# Patient Record
(demographics unavailable — no encounter records)

---

## 2024-10-08 NOTE — BEGINNING OF VISIT
[0] : 2) Feeling down, depressed, or hopeless: Not at all (0) [PHQ-2 Negative] : PHQ-2 Negative [PHQ-9 Negative] : PHQ-9 Negative [Pain Scale: ___] : On a scale of 1-10, today the patient's pain is a(n) [unfilled]. [Never] : Never [Date Discussed (MM/DD/YY): ___] : Discussed: [unfilled] [Reviewed, no changes] : Reviewed, no changes [[id number]] : 0

## 2024-10-08 NOTE — BEGINNING OF VISIT
[0] : 2) Feeling down, depressed, or hopeless: Not at all (0) [PHQ-2 Negative] : PHQ-2 Negative [PHQ-9 Negative] : PHQ-9 Negative [Pain Scale: ___] : On a scale of 1-10, today the patient's pain is a(n) [unfilled]. [Never] : Never [Date Discussed (MM/DD/YY): ___] : Discussed: [unfilled] [Reviewed, no changes] : Reviewed, no changes [[id number]] : 0

## 2024-10-08 NOTE — BEGINNING OF VISIT
[0] : 2) Feeling down, depressed, or hopeless: Not at all (0) [PHQ-2 Negative] : PHQ-2 Negative [PHQ-9 Negative] : PHQ-9 Negative [Pain Scale: ___] : On a scale of 1-10, today the patient's pain is a(n) [unfilled]. [Never] : Never [Date Discussed (MM/DD/YY): ___] : Discussed: [unfilled] [Reviewed, no changes] : Reviewed, no changes [[id number]] : 0

## 2024-10-08 NOTE — REVIEW OF SYSTEMS
[Loss of Hearing] : loss of hearing [Lower Ext Edema] : lower extremity edema [Joint Pain] : joint pain [Negative] : Allergic/Immunologic

## 2024-10-10 NOTE — HISTORY OF PRESENT ILLNESS
[de-identified] : f/u of right hip had MRI showed partial thickness abductor tear has ttp over the GT at this point her main complaint is the abductor lurch while walking discussed trx options including PT vs surgery at this time will try PT would like to avoid surgery script given for PT f/u after PT

## 2024-10-11 NOTE — PHYSICAL EXAM
[Ambulatory and capable of all self care but unable to carry out any work activities] : Status 2- Ambulatory and capable of all self care but unable to carry out any work activities. Up and about more than 50% of waking hours [Normal] : affect appropriate [de-identified] : Uses cane for ambulation. [de-identified] : Old and well healed right breast mastectomy scar. [de-identified] : Decreased range of motion in her right shoulder status post right-sided mastectomy.

## 2024-10-11 NOTE — PHYSICAL EXAM
[Ambulatory and capable of all self care but unable to carry out any work activities] : Status 2- Ambulatory and capable of all self care but unable to carry out any work activities. Up and about more than 50% of waking hours [Normal] : affect appropriate [de-identified] : Uses cane for ambulation. [de-identified] : Old and well healed right breast mastectomy scar. [de-identified] : Decreased range of motion in her right shoulder status post right-sided mastectomy.

## 2024-10-11 NOTE — HISTORY OF PRESENT ILLNESS
[Disease: _____________________] : Disease: [unfilled] [AJCC Stage: ____] : AJCC Stage: [unfilled] [de-identified] : CC: Here to continue her treatment for right-sided breast cancer.  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  She was treated as below.  Please note records were in [place] and I did my best with my nurse practitioner to interpret the results but they are aware that English translation as needed.  07/2023 She relocated from [place] to continue her treatment here in the [facility].  The following is a summary of past treatments that she did in [place], [place] and [place]: 2013 she noticed hardness in her right breast, but biopsy revealed only fatty tissue and her mammogram was negative. 10/10/2014 Initially diagnosed with right breast carcinoma stage IIIb ([id number]) with right supraclavicular lymph nodes involvement.  Biopsy revealed:  11/23/2014-04/25/2015 completed 6 courses of pertuzumab, trastuzumab and docetaxel. 04/25/2025-11/10/2015 completed 6 cycles of Herceptin IV. 04/2015 started tamoxifen 1 tab daily.  05/20/2015 underwent right mastectomy with lymph nodes excision. 08/26//2015 radiation treatment to right breast. 11/16/2016 PET - progression in right supraclavicular lymph node. 01/20/2017 PET - progression in right supraclavicular lymph node increased in size. 01/20/2017 completed a course of tamoxifen. 01/2017 noted with progression in right subclavicular lymph node. Her treatment was changed to trastuzumab and anastrozole and she got it from 09/20/2017 to 08/20/2017.  which later was changed to exemestane, and she continued on it till 2019 when she was noted with progression to lymph nodes.  Again, she had biopsy, radiation and changed to a different aromatase inhibitor.  08/14/2020 again progression in the lymph nodes of her right neck.  09/03/2020 lymph node biopsy revealed: poorly differentiated carcinoma. 09/2020 started on trastuzumab and exemestane. 05/2021 she had radiation treatment to right supraclavicular area. 06/2022 and 12/2022 PET revealed positive response to the treatment and decrease FGD uptake in lymph nodes on both sides of her neck, right supraclavicular and mediastinal.  and started Kadcyla 3.6 mg/kg.  She received 5 treatments and had PET scan which showed response to the treatment.  She had 2 additional treatments after that and tolerated it well. Her last treatment was on 06/08/2023 and then postponed due to thrombocytopenia. [de-identified] : ER+Her2+ HI -0 [de-identified] : 09/01/2023 accompanied by her son; Reports feeling well, no changes in chronic conditions or new complaints since the last visit.  09/06/2023 accompanied by her son; Reports feeling well, but noticed with increasing bruising.  9/27/23 She is here for follow up. She is due for cycle 3 in [facility] but cycle 7 total of Kadcyla. PLT today are 64,000 She has some bruising but no major bleeding.  10/18/2023 accompanied by son; Reports feeling well, but with difficulties to control her BP as she is watching news; no other changes in chronic conditions or new complaints since the last visit.  11/08/2023 accompanied by her daughter-in-law; Reports feeling well, but has discomfort at right neck area "where were previous lymph nodes before the last progression".  11/29/2023 accompanied by her son; Reports feeling well, no changes in chronic conditions or new complaints since the last visit. She had PET/CT on 11/22/23 [facility]. CBC today with stable mild thrombocytopenia. Due for cycle 6 of Kadcyla today.  12/20/2023 accompanied by her son; Reports feeling well, no changes in chronic conditions or new complaints since the last visit.  01/10/2024 accompanied by her son; Reports feeling well now, but had RUQ discomfort for 1 week post last treatment - all resolved now.  01/31/2024 accompanied by her daughter-in-law; Reports feeling well, but with RUQ discomfort for a few days after the last treatment - self-resolved.  2/21/24 She is here for follow-up. She is due for cycle 10 of Kadcyla CBC is stable with . She had a PET.CT on 2/20/24: IMPRESSION: 1. Since November 21, 2023, no new site of pathologic FDG uptake to suggest biologic tumor activity.  4/3/2024 She is here for follow up. She is due for cycle 12 of Kadcyla today CBC is with PLT 74. She feels well. had normal dexa.  04/24/2024 accompanied by her son; Reports feeling well at the baseline of her health status, no changes in chronic conditions or new complaints since the last visit.  6/5/24 She is here for follow up. She has been having some RUQ pain. LFTs stable give or take maybe Gallstones. CBC today stable with PLT 89; No other issues.  06/26/2024 accompanied by her son; Reports feeling well at the baseline of her health status, no changes in chronic conditions or new complaints since the last visit. Recent echo with LVEF 66%.  07/17/2024 accompanied by her daughter-in-law; Reports feeling well at the baseline of her health status, no changes in chronic conditions or new complaints since the last visit. She has whitecoat syndrome and her blood pressure goes up.  08/07/2024 Reports feeling well at the baseline of her health status, no changes in chronic conditions or new complaints since the last visit.  08/28/2024 Reports feeling well at the baseline of her health status, no changes in chronic conditions or new complaints since the last visit. She did not start Promacta yet, but has it ready at home as we decided to see what her PLT level will be today.  09/18/2024 She is here for F/U and treatment. We tried to switch Nplate to Promacta, but she developed significant generalized pruritus, and we stopped it. She took Promacta for a few days only. She feels well now, and pruritus resolved.  10/08/2024 Reports feeling well at the baseline of her health status, no changes in chronic conditions or new complaints since the last visit.

## 2024-10-11 NOTE — ASSESSMENT
[Palliative] : Goals of care discussed with patient: Palliative [Patient/Caregiver not ready to engage] : Patient/Caregiver not ready to engage [FreeTextEntry1] : # Malignant neoplasm of right breast initially staged IIIB ([id number]) diagnosed in 2014. - Briefly she has been treated with Taxotere, Pertuzumab and Herceptin, Herceptin maintenance, trastuzumab, exemestane, Talbert, Anastrozole, multiple radiation to the supraclavicular and chest wall as well, and now on TDM 1 with demonstrated response unfortunately course has been complicated by thrombocytopenia. She was previously on full dose 3.6 mg /kg but this has been on hold due to thrombocytopenia. She got total of 4 cycles prior transferring her care to us. - Ideally translation of records into English will be helpful. - 08/11/2023 restarted TDM-1 with Ado-trastuzumab emtansine (Kadcyla) at a lower dose level 3 mg/kg given the thrombocytopenia (in past) is on his platelet count is greater than 75,000 - as Cycle 5. - 09/27/2023 Ado-trastuzumab emtansine (Kadcyla) dose decrease to 2.4 mg/kg due to thrombocytopenia. - 11/08/2023 the patient reports right supraclavicular discomfort (similar as to when she progressed the last time). - 11/22/2023 PET - No definite sites of pathologic FDG uptake. - 02/20/2024 PET/CT - [facility]. - 02/2024 ECHO EF 67%. - 06/20/2024 ECHO LV Ejection Fraction EF of 66 %. - 09/12/2024 ECHO LV Ejection Fraction EF of 66 %. - 09/23/2024 CT C/A/P - Since  July 11, 2024. No current CT evidence of active intrathoracic disease. Post therapeutic changes from right mastectomy, involving right upper lung field. No evidence of metastatic disease within the abdomen and pelvis.  # Moderate thrombocytopenia from treatment. - had dose reductions. - 12/20/2023 started NPlate 3 mg/kg with every treatment Q3W if PLT<50-60K. - 08/2024 INS denied continuation of Nplate and therefore we decided to switch to Promacta 25 mg PO QD. - 09/18/2024 unable to tolerate Promacta due to significant pruritus and therefore d/c and reinstitute Nplate at 1 mcg/kg.  # Mild LFTs likely form Kadcyla less likely from statin - stable - will monitor. - she has known gallstones as well with colic will see what CT shows.  10/07/2024 CT and Labs reviewed and results discussed with the patient - remains clinically stable to continue current management. All questions were answered to satisfaction.  PLAN: - continue Ado-trastuzumab emtansine (Kadcyla) 2.4 mg/kg Q21D - C21 GIVE TODAY. - continue Nplate at 1 mcg/kg Q3W - GIVE TODAY. - repeat ECHOcardio if symptomatic or every 3-4 months - 12/2024. - repeat CT C/A/P for monitoring Q3M - 01/2025. - Labs today: CBC CMP Ca15-3 RTC in 3 weeks with CBC CMP Ca15-3 and same day treatment. [AdvancecareDate] : 12/20/2023

## 2024-10-11 NOTE — HISTORY OF PRESENT ILLNESS
[Disease: _____________________] : Disease: [unfilled] [AJCC Stage: ____] : AJCC Stage: [unfilled] [de-identified] : CC: Here to continue her treatment for right-sided breast cancer.  This is a very pleasant 80-year-old female she has a history of recurrent ER positive, [place] negative, HER2 positive breast cancer.  She was treated as below.  Please note records were in [place] and I did my best with my nurse practitioner to interpret the results but they are aware that English translation as needed.  07/2023 She relocated from [place] to continue her treatment here in the [facility].  The following is a summary of past treatments that she did in [place], [place] and [place]: 2013 she noticed hardness in her right breast, but biopsy revealed only fatty tissue and her mammogram was negative. 10/10/2014 Initially diagnosed with right breast carcinoma stage IIIb ([id number]) with right supraclavicular lymph nodes involvement.  Biopsy revealed:  11/23/2014-04/25/2015 completed 6 courses of pertuzumab, trastuzumab and docetaxel. 04/25/2025-11/10/2015 completed 6 cycles of Herceptin IV. 04/2015 started tamoxifen 1 tab daily.  05/20/2015 underwent right mastectomy with lymph nodes excision. 08/26//2015 radiation treatment to right breast. 11/16/2016 PET - progression in right supraclavicular lymph node. 01/20/2017 PET - progression in right supraclavicular lymph node increased in size. 01/20/2017 completed a course of tamoxifen. 01/2017 noted with progression in right subclavicular lymph node. Her treatment was changed to trastuzumab and anastrozole and she got it from 09/20/2017 to 08/20/2017.  which later was changed to exemestane, and she continued on it till 2019 when she was noted with progression to lymph nodes.  Again, she had biopsy, radiation and changed to a different aromatase inhibitor.  08/14/2020 again progression in the lymph nodes of her right neck.  09/03/2020 lymph node biopsy revealed: poorly differentiated carcinoma. 09/2020 started on trastuzumab and exemestane. 05/2021 she had radiation treatment to right supraclavicular area. 06/2022 and 12/2022 PET revealed positive response to the treatment and decrease FGD uptake in lymph nodes on both sides of her neck, right supraclavicular and mediastinal.  and started Kadcyla 3.6 mg/kg.  She received 5 treatments and had PET scan which showed response to the treatment.  She had 2 additional treatments after that and tolerated it well. Her last treatment was on 06/08/2023 and then postponed due to thrombocytopenia. [de-identified] : ER+Her2+ OR -0 [de-identified] : 09/01/2023 accompanied by her son; Reports feeling well, no changes in chronic conditions or new complaints since the last visit.  09/06/2023 accompanied by her son; Reports feeling well, but noticed with increasing bruising.  9/27/23 She is here for follow up. She is due for cycle 3 in [facility] but cycle 7 total of Kadcyla. PLT today are 64,000 She has some bruising but no major bleeding.  10/18/2023 accompanied by son; Reports feeling well, but with difficulties to control her BP as she is watching news; no other changes in chronic conditions or new complaints since the last visit.  11/08/2023 accompanied by her daughter-in-law; Reports feeling well, but has discomfort at right neck area "where were previous lymph nodes before the last progression".  11/29/2023 accompanied by her son; Reports feeling well, no changes in chronic conditions or new complaints since the last visit. She had PET/CT on 11/22/23 [facility]. CBC today with stable mild thrombocytopenia. Due for cycle 6 of Kadcyla today.  12/20/2023 accompanied by her son; Reports feeling well, no changes in chronic conditions or new complaints since the last visit.  01/10/2024 accompanied by her son; Reports feeling well now, but had RUQ discomfort for 1 week post last treatment - all resolved now.  01/31/2024 accompanied by her daughter-in-law; Reports feeling well, but with RUQ discomfort for a few days after the last treatment - self-resolved.  2/21/24 She is here for follow-up. She is due for cycle 10 of Kadcyla CBC is stable with . She had a PET.CT on 2/20/24: IMPRESSION: 1. Since November 21, 2023, no new site of pathologic FDG uptake to suggest biologic tumor activity.  4/3/2024 She is here for follow up. She is due for cycle 12 of Kadcyla today CBC is with PLT 74. She feels well. had normal dexa.  04/24/2024 accompanied by her son; Reports feeling well at the baseline of her health status, no changes in chronic conditions or new complaints since the last visit.  6/5/24 She is here for follow up. She has been having some RUQ pain. LFTs stable give or take maybe Gallstones. CBC today stable with PLT 89; No other issues.  06/26/2024 accompanied by her son; Reports feeling well at the baseline of her health status, no changes in chronic conditions or new complaints since the last visit. Recent echo with LVEF 66%.  07/17/2024 accompanied by her daughter-in-law; Reports feeling well at the baseline of her health status, no changes in chronic conditions or new complaints since the last visit. She has whitecoat syndrome and her blood pressure goes up.  08/07/2024 Reports feeling well at the baseline of her health status, no changes in chronic conditions or new complaints since the last visit.  08/28/2024 Reports feeling well at the baseline of her health status, no changes in chronic conditions or new complaints since the last visit. She did not start Promacta yet, but has it ready at home as we decided to see what her PLT level will be today.  09/18/2024 She is here for F/U and treatment. We tried to switch Nplate to Promacta, but she developed significant generalized pruritus, and we stopped it. She took Promacta for a few days only. She feels well now, and pruritus resolved.  10/08/2024 Reports feeling well at the baseline of her health status, no changes in chronic conditions or new complaints since the last visit.

## 2024-10-11 NOTE — ASSESSMENT
[Palliative] : Goals of care discussed with patient: Palliative [Patient/Caregiver not ready to engage] : Patient/Caregiver not ready to engage [FreeTextEntry1] : # Malignant neoplasm of right breast initially staged IIIB ([id number]) diagnosed in 2014. - Briefly she has been treated with Taxotere, Pertuzumab and Herceptin, Herceptin maintenance, trastuzumab, exemestane, Talbert, Anastrozole, multiple radiation to the supraclavicular and chest wall as well, and now on TDM 1 with demonstrated response unfortunately course has been complicated by thrombocytopenia. She was previously on full dose 3.6 mg /kg but this has been on hold due to thrombocytopenia. She got total of 4 cycles prior transferring her care to us. - Ideally translation of records into English will be helpful. - 08/11/2023 restarted TDM-1 with Ado-trastuzumab emtansine (Kadcyla) at a lower dose level 3 mg/kg given the thrombocytopenia (in past) is on his platelet count is greater than 75,000 - as Cycle 5. - 09/27/2023 Ado-trastuzumab emtansine (Kadcyla) dose decrease to 2.4 mg/kg due to thrombocytopenia. - 11/08/2023 the patient reports right supraclavicular discomfort (similar as to when she progressed the last time). - 11/22/2023 PET - No definite sites of pathologic FDG uptake. - 02/20/2024 PET/CT - [facility]. - 02/2024 ECHO EF 67%. - 06/20/2024 ECHO LV Ejection Fraction EF of 66 %. - 09/12/2024 ECHO LV Ejection Fraction EF of 66 %. - 09/23/2024 CT C/A/P - Since  July 11, 2024. No current CT evidence of active intrathoracic disease. Post therapeutic changes from right mastectomy, involving right upper lung field. No evidence of metastatic disease within the abdomen and pelvis.  # Moderate thrombocytopenia from treatment. - had dose reductions. - 12/20/2023 started NPlate 3 mg/kg with every treatment Q3W if PLT<50-60K. - 08/2024 INS denied continuation of Nplate and therefore we decided to switch to Promacta 25 mg PO QD. - 09/18/2024 unable to tolerate Promacta due to significant pruritus and therefore d/c and reinstitute Nplate at 1 mcg/kg.  # Mild LFTs likely form Kadcyla less likely from statin - stable - will monitor. - she has known gallstones as well with colic will see what CT shows.  10/07/2024 CT and Labs reviewed and results discussed with the patient - remains clinically stable to continue current management. All questions were answered to satisfaction.  PLAN: - continue Ado-trastuzumab emtansine (Kadcyla) 2.4 mg/kg Q21D - C21 GIVE TODAY. - continue Nplate at 1 mcg/kg Q3W - GIVE TODAY. - repeat ECHOcardio if symptomatic or every 3-4 months - 12/2024. - repeat CT C/A/P for monitoring Q3M - 01/2025. - Labs today: CBC CMP Ca15-3 RTC in 3 weeks with CBC CMP Ca15-3 and same day treatment. [AdvancecareDate] : 12/20/2023

## 2024-10-11 NOTE — HISTORY OF PRESENT ILLNESS
[Disease: _____________________] : Disease: [unfilled] [AJCC Stage: ____] : AJCC Stage: [unfilled] [de-identified] : CC: Here to continue her treatment for right-sided breast cancer.  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  She was treated as below.  Please note records were in [place] and I did my best with my nurse practitioner to interpret the results but they are aware that English translation as needed.  07/2023 She relocated from [place] to continue her treatment here in the [facility].  The following is a summary of past treatments that she did in [place], [place] and [place]: 2013 she noticed hardness in her right breast, but biopsy revealed only fatty tissue and her mammogram was negative. 10/10/2014 Initially diagnosed with right breast carcinoma stage IIIb ([id number]) with right supraclavicular lymph nodes involvement.  Biopsy revealed:  11/23/2014-04/25/2015 completed 6 courses of pertuzumab, trastuzumab and docetaxel. 04/25/2025-11/10/2015 completed 6 cycles of Herceptin IV. 04/2015 started tamoxifen 1 tab daily.  05/20/2015 underwent right mastectomy with lymph nodes excision. 08/26//2015 radiation treatment to right breast. 11/16/2016 PET - progression in right supraclavicular lymph node. 01/20/2017 PET - progression in right supraclavicular lymph node increased in size. 01/20/2017 completed a course of tamoxifen. 01/2017 noted with progression in right subclavicular lymph node. Her treatment was changed to trastuzumab and anastrozole and she got it from 09/20/2017 to 08/20/2017.  which later was changed to exemestane, and she continued on it till 2019 when she was noted with progression to lymph nodes.  Again, she had biopsy, radiation and changed to a different aromatase inhibitor.  08/14/2020 again progression in the lymph nodes of her right neck.  09/03/2020 lymph node biopsy revealed: poorly differentiated carcinoma. 09/2020 started on trastuzumab and exemestane. 05/2021 she had radiation treatment to right supraclavicular area. 06/2022 and 12/2022 PET revealed positive response to the treatment and decrease FGD uptake in lymph nodes on both sides of her neck, right supraclavicular and mediastinal.  and started Kadcyla 3.6 mg/kg.  She received 5 treatments and had PET scan which showed response to the treatment.  She had 2 additional treatments after that and tolerated it well. Her last treatment was on 06/08/2023 and then postponed due to thrombocytopenia. [de-identified] : ER+Her2+ [place] -0 [de-identified] : 09/01/2023 accompanied by her son; Reports feeling well, no changes in chronic conditions or new complaints since the last visit.  09/06/2023 accompanied by her son; Reports feeling well, but noticed with increasing bruising.  9/27/23 She is here for follow up. She is due for cycle 3 in [facility] but cycle 7 total of Kadcyla. PLT today are 64,000 She has some bruising but no major bleeding.  10/18/2023 accompanied by son; Reports feeling well, but with difficulties to control her BP as she is watching news; no other changes in chronic conditions or new complaints since the last visit.  11/08/2023 accompanied by her daughter-in-law; Reports feeling well, but has discomfort at right neck area "where were previous lymph nodes before the last progression".  11/29/2023 accompanied by her son; Reports feeling well, no changes in chronic conditions or new complaints since the last visit. She had PET/CT on 11/22/23 [facility]. CBC today with stable mild thrombocytopenia. Due for cycle 6 of Kadcyla today.  12/20/2023 accompanied by her son; Reports feeling well, no changes in chronic conditions or new complaints since the last visit.  01/10/2024 accompanied by her son; Reports feeling well now, but had RUQ discomfort for 1 week post last treatment - all resolved now.  01/31/2024 accompanied by her daughter-in-law; Reports feeling well, but with RUQ discomfort for a few days after the last treatment - self-resolved.  2/21/24 She is here for follow-up. She is due for cycle 10 of Kadcyla CBC is stable with . She had a PET.CT on 2/20/24: IMPRESSION: 1. Since November 21, 2023, no new site of pathologic FDG uptake to suggest biologic tumor activity.  4/3/2024 She is here for follow up. She is due for cycle 12 of Kadcyla today CBC is with PLT 74. She feels well. had normal dexa.  04/24/2024 accompanied by her son; Reports feeling well at the baseline of her health status, no changes in chronic conditions or new complaints since the last visit.  6/5/24 She is here for follow up. She has been having some RUQ pain. LFTs stable give or take maybe Gallstones. CBC today stable with PLT 89; No other issues.  06/26/2024 accompanied by her son; Reports feeling well at the baseline of her health status, no changes in chronic conditions or new complaints since the last visit. Recent echo with LVEF 66%.  07/17/2024 accompanied by her daughter-in-law; Reports feeling well at the baseline of her health status, no changes in chronic conditions or new complaints since the last visit. She has whitecoat syndrome and her blood pressure goes up.  08/07/2024 Reports feeling well at the baseline of her health status, no changes in chronic conditions or new complaints since the last visit.  08/28/2024 Reports feeling well at the baseline of her health status, no changes in chronic conditions or new complaints since the last visit. She did not start Promacta yet, but has it ready at home as we decided to see what her PLT level will be today.  09/18/2024 She is here for F/U and treatment. We tried to switch Nplate to Promacta, but she developed significant generalized pruritus, and we stopped it. She took Promacta for a few days only. She feels well now, and pruritus resolved.  10/08/2024 Reports feeling well at the baseline of her health status, no changes in chronic conditions or new complaints since the last visit.

## 2024-10-11 NOTE — END OF VISIT
[FreeTextEntry3] : I was physically present for the key portions of the evaluation and management service provided.  I agree with the history and physical, and plan which I have reviewed and edited where appropriate.  She is here for follow-up she remains on (Kadcyla) as well as Nplate for support she is doing well next imaging is in January 2025 recent CTs in September were [facility]

## 2024-10-11 NOTE — ASSESSMENT
[Palliative] : Goals of care discussed with patient: Palliative [Patient/Caregiver not ready to engage] : Patient/Caregiver not ready to engage [FreeTextEntry1] : # Malignant neoplasm of right breast initially staged IIIB ([id number]) diagnosed in 2014. - Briefly she has been treated with Taxotere, Pertuzumab and Herceptin, Herceptin maintenance, trastuzumab, exemestane, Talbert, Anastrozole, multiple radiation to the supraclavicular and chest wall as well, and now on TDM 1 with demonstrated response unfortunately course has been complicated by thrombocytopenia. She was previously on full dose 3.6 mg /kg but this has been on hold due to thrombocytopenia. She got total of 4 cycles prior transferring her care to us. - Ideally translation of records into English will be helpful. - 08/11/2023 restarted TDM-1 with Ado-trastuzumab emtansine (Kadcyla) at a lower dose level 3 mg/kg given the thrombocytopenia (in past) is on his platelet count is greater than 75,000 - as Cycle 5. - 09/27/2023 Ado-trastuzumab emtansine (Kadcyla) dose decrease to 2.4 mg/kg due to thrombocytopenia. - 11/08/2023 the patient reports right supraclavicular discomfort (similar as to when she progressed the last time). - 11/22/2023 PET - No definite sites of pathologic FDG uptake. - 02/20/2024 PET/CT - [facility]. - 02/2024 ECHO EF 67%. - 06/20/2024 ECHO LV Ejection Fraction EF of 66 %. - 09/12/2024 ECHO LV Ejection Fraction EF of 66 %. - 09/23/2024 CT C/A/P - Since  July 11, 2024. No current CT evidence of active intrathoracic disease. Post therapeutic changes from right mastectomy, involving right upper lung field. No evidence of metastatic disease within the abdomen and pelvis.  # Moderate thrombocytopenia from treatment. - had dose reductions. - 12/20/2023 started NPlate 3 mg/kg with every treatment Q3W if PLT<50-60K. - 08/2024 INS denied continuation of Nplate and therefore we decided to switch to Promacta 25 mg PO QD. - 09/18/2024 unable to tolerate Promacta due to significant pruritus and therefore d/c and reinstitute Nplate at 1 mcg/kg.  # Mild LFTs likely form Kadcyla less likely from statin - stable - will monitor. - she has known gallstones as well with colic will see what CT shows.  10/07/2024 CT and Labs reviewed and results discussed with the patient - remains clinically stable to continue current management. All questions were answered to satisfaction.  PLAN: - continue Ado-trastuzumab emtansine (Kadcyla) 2.4 mg/kg Q21D - C21 GIVE TODAY. - continue Nplate at 1 mcg/kg Q3W - GIVE TODAY. - repeat ECHOcardio if symptomatic or every 3-4 months - 12/2024. - repeat CT C/A/P for monitoring Q3M - 01/2025. - Labs today: CBC CMP Ca15-3 RTC in 3 weeks with CBC CMP Ca15-3 and same day treatment. [AdvancecareDate] : 12/20/2023

## 2024-10-11 NOTE — PHYSICAL EXAM
[Ambulatory and capable of all self care but unable to carry out any work activities] : Status 2- Ambulatory and capable of all self care but unable to carry out any work activities. Up and about more than 50% of waking hours [Normal] : affect appropriate [de-identified] : Uses cane for ambulation. [de-identified] : Old and well healed right breast mastectomy scar. [de-identified] : Decreased range of motion in her right shoulder status post right-sided mastectomy.

## 2024-11-01 NOTE — PHYSICAL EXAM
[Ambulatory and capable of all self care but unable to carry out any work activities] : Status 2- Ambulatory and capable of all self care but unable to carry out any work activities. Up and about more than 50% of waking hours [Normal] : affect appropriate [de-identified] : Uses cane for ambulation. [de-identified] : Old and well healed right breast mastectomy scar. [de-identified] : Decreased range of motion in her right shoulder status post right-sided mastectomy.

## 2024-11-01 NOTE — ASSESSMENT
[Palliative] : Goals of care discussed with patient: Palliative [Patient/Caregiver not ready to engage] : Patient/Caregiver not ready to engage [FreeTextEntry1] : # Malignant neoplasm of right breast initially staged IIIB ([id number]) diagnosed in 2014. - Briefly she has been treated with Taxotere, Pertuzumab and Herceptin, Herceptin maintenance, trastuzumab, exemestane, Talbert, Anastrozole, multiple radiation to the supraclavicular and chest wall as well, and now on TDM 1 with demonstrated response unfortunately course has been complicated by thrombocytopenia. She was previously on full dose 3.6 mg /kg but this has been on hold due to thrombocytopenia. She got total of 4 cycles prior transferring her care to us. - Ideally translation of records into English will be helpful. - 08/11/2023 restarted TDM-1 with Ado-trastuzumab emtansine (Kadcyla) at a lower dose level 3 mg/kg given the thrombocytopenia (in past) is on his platelet count is greater than 75,000 - as Cycle 5. - 09/27/2023 Ado-trastuzumab emtansine (Kadcyla) dose decrease to 2.4 mg/kg due to thrombocytopenia. - 11/08/2023 the patient reports right supraclavicular discomfort (similar as to when she progressed the last time). - 11/22/2023 PET - No definite sites of pathologic FDG uptake. - 02/20/2024 PET/CT - [facility]. - 02/2024 ECHO EF 67%. - 06/20/2024 ECHO LV Ejection Fraction EF of 66 %. - 09/12/2024 ECHO LV Ejection Fraction EF of 66 %. - 09/23/2024 CT C/A/P - Since July 11, 2024. No current CT evidence of active intrathoracic disease. Post therapeutic changes from right mastectomy, involving right upper lung field. No evidence of metastatic disease within the abdomen and pelvis.  # Moderate thrombocytopenia from treatment. - had dose reductions. - 12/20/2023 started NPlate 3 mg/kg with every treatment Q3W if PLT<50-60K. - 08/2024 INS denied continuation of Nplate and therefore we decided to switch to Promacta 25 mg PO QD. - 09/18/2024 unable to tolerate Promacta due to significant pruritus and therefore d/c and reinstitute Nplate at 1 mcg/kg.  # Mild LFTs likely form Kadcyla less likely from statin - stable - will monitor. - she has known gallstones as well with colic will see what CT shows.  10/30//2024 Labs reviewed and results discussed with the patient - remains clinically stable to continue current management. All questions were answered to satisfaction.  PLAN: - continue Ado-trastuzumab emtansine (Kadcyla) 2.4 mg/kg Q21D - C22 GIVE TODAY. - continue Nplate at 1 mcg/kg Q3W - GIVE TODAY. - repeat ECHOcardio if symptomatic or every 3-4 months - 12/2024. - repeat CT C/A/P for monitoring Q3M - 01/2025. - Labs today: CBC CMP Ca15-3 RTC in 3 weeks with CBC CMP Ca15-3 and same day treatment. [AdvancecareDate] : 12/20/2023

## 2024-11-01 NOTE — PHYSICAL EXAM
[Ambulatory and capable of all self care but unable to carry out any work activities] : Status 2- Ambulatory and capable of all self care but unable to carry out any work activities. Up and about more than 50% of waking hours [Normal] : affect appropriate [de-identified] : Uses cane for ambulation. [de-identified] : Old and well healed right breast mastectomy scar. [de-identified] : Decreased range of motion in her right shoulder status post right-sided mastectomy.

## 2024-11-01 NOTE — HISTORY OF PRESENT ILLNESS
[Disease: _____________________] : Disease: [unfilled] [AJCC Stage: ____] : AJCC Stage: [unfilled] [de-identified] : CC: Here to continue her treatment for right-sided breast cancer.  This is a very pleasant 80-year-old female she has a history of recurrent ER positive, [place] negative, HER2 positive breast cancer.  She was treated as below.  Please note records were in [place] and I did my best with my nurse practitioner to interpret the results but they are aware that English translation as needed.  07/2023 She relocated from [place] to continue her treatment here in the [facility].  The following is a summary of past treatments that she did in [place], [place] and [place]: 2013 she noticed hardness in her right breast, but biopsy revealed only fatty tissue and her mammogram was negative. 10/10/2014 Initially diagnosed with right breast carcinoma stage IIIb ([id number]) with right supraclavicular lymph nodes involvement.  Biopsy revealed:  11/23/2014-04/25/2015 completed 6 courses of pertuzumab, trastuzumab and docetaxel. 04/25/2025-11/10/2015 completed 6 cycles of Herceptin IV. 04/2015 started tamoxifen 1 tab daily.  05/20/2015 underwent right mastectomy with lymph nodes excision. 08/26//2015 radiation treatment to right breast. 11/16/2016 PET - progression in right supraclavicular lymph node. 01/20/2017 PET - progression in right supraclavicular lymph node increased in size. 01/20/2017 completed a course of tamoxifen. 01/2017 noted with progression in right subclavicular lymph node. Her treatment was changed to trastuzumab and anastrozole and she got it from 09/20/2017 to 08/20/2017.  which later was changed to exemestane, and she continued on it till 2019 when she was noted with progression to lymph nodes.  Again, she had biopsy, radiation and changed to a different aromatase inhibitor.  08/14/2020 again progression in the lymph nodes of her right neck.  09/03/2020 lymph node biopsy revealed: poorly differentiated carcinoma. 09/2020 started on trastuzumab and exemestane. 05/2021 she had radiation treatment to right supraclavicular area. 06/2022 and 12/2022 PET revealed positive response to the treatment and decrease FGD uptake in lymph nodes on both sides of her neck, right supraclavicular and mediastinal.  and started Kadcyla 3.6 mg/kg.  She received 5 treatments and had PET scan which showed response to the treatment.  She had 2 additional treatments after that and tolerated it well. Her last treatment was on 06/08/2023 and then postponed due to thrombocytopenia. [de-identified] : ER+Her2+ [place] -0 [de-identified] : 09/01/2023 accompanied by her son; Reports feeling well, no changes in chronic conditions or new complaints since the last visit.  09/06/2023 accompanied by her son; Reports feeling well, but noticed with increasing bruising.  9/27/23 She is here for follow up. She is due for cycle 3 in [facility] but cycle 7 total of Kadcyla. PLT today are 64,000 She has some bruising but no major bleeding.  10/18/2023 accompanied by son; Reports feeling well, but with difficulties to control her BP as she is watching news; no other changes in chronic conditions or new complaints since the last visit.  11/08/2023 accompanied by her daughter-in-law; Reports feeling well, but has discomfort at right neck area "where were previous lymph nodes before the last progression".  11/29/2023 accompanied by her son; Reports feeling well, no changes in chronic conditions or new complaints since the last visit. She had PET/CT on 11/22/23 [facility]. CBC today with stable mild thrombocytopenia. Due for cycle 6 of Kadcyla today.  12/20/2023 accompanied by her son; Reports feeling well, no changes in chronic conditions or new complaints since the last visit.  01/10/2024 accompanied by her son; Reports feeling well now, but had RUQ discomfort for 1 week post last treatment - all resolved now.  01/31/2024 accompanied by her daughter-in-law; Reports feeling well, but with RUQ discomfort for a few days after the last treatment - self-resolved.  2/21/24 She is here for follow-up. She is due for cycle 10 of Kadcyla CBC is stable with . She had a PET.CT on 2/20/24: IMPRESSION: 1. Since November 21, 2023, no new site of pathologic FDG uptake to suggest biologic tumor activity.  4/3/2024 She is here for follow up. She is due for cycle 12 of Kadcyla today CBC is with PLT 74. She feels well. had normal dexa.  04/24/2024 accompanied by her son; Reports feeling well at the baseline of her health status, no changes in chronic conditions or new complaints since the last visit.  6/5/24 She is here for follow up. She has been having some RUQ pain. LFTs stable give or take maybe Gallstones. CBC today stable with PLT 89; No other issues.  06/26/2024 accompanied by her son; Reports feeling well at the baseline of her health status, no changes in chronic conditions or new complaints since the last visit. Recent echo with LVEF 66%.  07/17/2024 accompanied by her daughter-in-law; Reports feeling well at the baseline of her health status, no changes in chronic conditions or new complaints since the last visit. She has whitecoat syndrome and her blood pressure goes up.  08/07/2024 Reports feeling well at the baseline of her health status, no changes in chronic conditions or new complaints since the last visit.  08/28/2024 Reports feeling well at the baseline of her health status, no changes in chronic conditions or new complaints since the last visit. She did not start Promacta yet, but has it ready at home as we decided to see what her PLT level will be today.  09/18/2024 She is here for F/U and treatment. We tried to switch Nplate to Promacta, but she developed significant generalized pruritus, and we stopped it. She took Promacta for a few days only. She feels well now, and pruritus resolved.  10/08/2024 Reports feeling well at the baseline of her health status, no changes in chronic conditions or new complaints since the last visit.  10/30/2024 Reports feeling well at the baseline of her health status, no changes in chronic conditions or new complaints since the last visit.

## 2024-11-01 NOTE — ASSESSMENT
[Palliative] : Goals of care discussed with patient: Palliative [Patient/Caregiver not ready to engage] : Patient/Caregiver not ready to engage [FreeTextEntry1] : # Malignant neoplasm of right breast initially staged IIIB ([id number]) diagnosed in 2014. - Briefly she has been treated with Taxotere, Pertuzumab and Herceptin, Herceptin maintenance, trastuzumab, exemestane, Talbert, Anastrozole, multiple radiation to the supraclavicular and chest wall as well, and now on TDM 1 with demonstrated response unfortunately course has been complicated by thrombocytopenia. She was previously on full dose 3.6 mg /kg but this has been on hold due to thrombocytopenia. She got total of 4 cycles prior transferring her care to us. - Ideally translation of records into English will be helpful. - 08/11/2023 restarted TDM-1 with Ado-trastuzumab emtansine (Kadcyla) at a lower dose level 3 mg/kg given the thrombocytopenia (in past) is on his platelet count is greater than 75,000 - as Cycle 5. - 09/27/2023 Ado-trastuzumab emtansine (Kadcyla) dose decrease to 2.4 mg/kg due to thrombocytopenia. - 11/08/2023 the patient reports right supraclavicular discomfort (similar as to when she progressed the last time). - 11/22/2023 PET - No definite sites of pathologic FDG uptake. - 02/20/2024 PET/CT - [facility]. - 02/2024 ECHO EF 67%. - 06/20/2024 ECHO LV Ejection Fraction EF of 66 %. - 09/12/2024 ECHO LV Ejection Fraction EF of 66 %. - 09/23/2024 CT C/A/P - Since July 11, 2024. No current CT evidence of active intrathoracic disease. Post therapeutic changes from right mastectomy, involving right upper lung field. No evidence of metastatic disease within the abdomen and pelvis.  # Moderate thrombocytopenia from treatment. - had dose reductions. - 12/20/2023 started NPlate 3 mg/kg with every treatment Q3W if PLT<50-60K. - 08/2024 INS denied continuation of Nplate and therefore we decided to switch to Promacta 25 mg PO QD. - 09/18/2024 unable to tolerate Promacta due to significant pruritus and therefore d/c and reinstitute Nplate at 1 mcg/kg.  # Mild LFTs likely form Kadcyla less likely from statin - stable - will monitor. - she has known gallstones as well with colic will see what CT shows.  10/30//2024 Labs reviewed and results discussed with the patient - remains clinically stable to continue current management. All questions were answered to satisfaction.  PLAN: - continue Ado-trastuzumab emtansine (Kadcyla) 2.4 mg/kg Q21D - C22 GIVE TODAY. - continue Nplate at 1 mcg/kg Q3W - GIVE TODAY. - repeat ECHOcardio if symptomatic or every 3-4 months - 12/2024. - repeat CT C/A/P for monitoring Q3M - 01/2025. - Labs today: CBC CMP Ca15-3 RTC in 3 weeks with CBC CMP Ca15-3 and same day treatment. [AdvancecareDate] : 12/20/2023

## 2024-11-01 NOTE — END OF VISIT
[FreeTextEntry3] : I was physically present for the key portions of the evaluation and management service provided.  I agree with the history and physical, and plan which I have reviewed and edited where appropriate.  She is here for follow-up she is doing well she is due for her next cycle of Kadcyla also remains on Nplate for support blood work was done and was reviewed platelet count was 79,000 she can continue

## 2024-11-01 NOTE — HISTORY OF PRESENT ILLNESS
[Disease: _____________________] : Disease: [unfilled] [AJCC Stage: ____] : AJCC Stage: [unfilled] [de-identified] : CC: Here to continue her treatment for right-sided breast cancer.  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  She was treated as below.  Please note records were in Togolese and I did my best with my nurse practitioner to interpret the results but they are aware that English translation as needed.  07/2023 She relocated from [place] to continue her treatment here in the [facility].  The following is a summary of past treatments that she did in [place], [place] and [place]: 2013 she noticed hardness in her right breast, but biopsy revealed only fatty tissue and her mammogram was negative. 10/10/2014 Initially diagnosed with right breast carcinoma stage IIIb ([id number]) with right supraclavicular lymph nodes involvement.  Biopsy revealed:  11/23/2014-04/25/2015 completed 6 courses of pertuzumab, trastuzumab and docetaxel. 04/25/2025-11/10/2015 completed 6 cycles of Herceptin IV. 04/2015 started tamoxifen 1 tab daily.  05/20/2015 underwent right mastectomy with lymph nodes excision. 08/26//2015 radiation treatment to right breast. 11/16/2016 PET - progression in right supraclavicular lymph node. 01/20/2017 PET - progression in right supraclavicular lymph node increased in size. 01/20/2017 completed a course of tamoxifen. 01/2017 noted with progression in right subclavicular lymph node. Her treatment was changed to trastuzumab and anastrozole and she got it from 09/20/2017 to 08/20/2017.  which later was changed to exemestane, and she continued on it till 2019 when she was noted with progression to lymph nodes.  Again, she had biopsy, radiation and changed to a different aromatase inhibitor.  08/14/2020 again progression in the lymph nodes of her right neck.  09/03/2020 lymph node biopsy revealed: poorly differentiated carcinoma. 09/2020 started on trastuzumab and exemestane. 05/2021 she had radiation treatment to right supraclavicular area. 06/2022 and 12/2022 PET revealed positive response to the treatment and decrease FGD uptake in lymph nodes on both sides of her neck, right supraclavicular and mediastinal.  and started Kadcyla 3.6 mg/kg.  She received 5 treatments and had PET scan which showed response to the treatment.  She had 2 additional treatments after that and tolerated it well. Her last treatment was on 06/08/2023 and then postponed due to thrombocytopenia. [de-identified] : ER+Her2+ GA -0 [de-identified] : 09/01/2023 accompanied by her son; Reports feeling well, no changes in chronic conditions or new complaints since the last visit.  09/06/2023 accompanied by her son; Reports feeling well, but noticed with increasing bruising.  9/27/23 She is here for follow up. She is due for cycle 3 in [facility] but cycle 7 total of Kadcyla. PLT today are 64,000 She has some bruising but no major bleeding.  10/18/2023 accompanied by son; Reports feeling well, but with difficulties to control her BP as she is watching news; no other changes in chronic conditions or new complaints since the last visit.  11/08/2023 accompanied by her daughter-in-law; Reports feeling well, but has discomfort at right neck area "where were previous lymph nodes before the last progression".  11/29/2023 accompanied by her son; Reports feeling well, no changes in chronic conditions or new complaints since the last visit. She had PET/CT on 11/22/23 [facility]. CBC today with stable mild thrombocytopenia. Due for cycle 6 of Kadcyla today.  12/20/2023 accompanied by her son; Reports feeling well, no changes in chronic conditions or new complaints since the last visit.  01/10/2024 accompanied by her son; Reports feeling well now, but had RUQ discomfort for 1 week post last treatment - all resolved now.  01/31/2024 accompanied by her daughter-in-law; Reports feeling well, but with RUQ discomfort for a few days after the last treatment - self-resolved.  2/21/24 She is here for follow-up. She is due for cycle 10 of Kadcyla CBC is stable with . She had a PET.CT on 2/20/24: IMPRESSION: 1. Since November 21, 2023, no new site of pathologic FDG uptake to suggest biologic tumor activity.  4/3/2024 She is here for follow up. She is due for cycle 12 of Kadcyla today CBC is with PLT 74. She feels well. had normal dexa.  04/24/2024 accompanied by her son; Reports feeling well at the baseline of her health status, no changes in chronic conditions or new complaints since the last visit.  6/5/24 She is here for follow up. She has been having some RUQ pain. LFTs stable give or take maybe Gallstones. CBC today stable with PLT 89; No other issues.  06/26/2024 accompanied by her son; Reports feeling well at the baseline of her health status, no changes in chronic conditions or new complaints since the last visit. Recent echo with LVEF 66%.  07/17/2024 accompanied by her daughter-in-law; Reports feeling well at the baseline of her health status, no changes in chronic conditions or new complaints since the last visit. She has whitecoat syndrome and her blood pressure goes up.  08/07/2024 Reports feeling well at the baseline of her health status, no changes in chronic conditions or new complaints since the last visit.  08/28/2024 Reports feeling well at the baseline of her health status, no changes in chronic conditions or new complaints since the last visit. She did not start Promacta yet, but has it ready at home as we decided to see what her PLT level will be today.  09/18/2024 She is here for F/U and treatment. We tried to switch Nplate to Promacta, but she developed significant generalized pruritus, and we stopped it. She took Promacta for a few days only. She feels well now, and pruritus resolved.  10/08/2024 Reports feeling well at the baseline of her health status, no changes in chronic conditions or new complaints since the last visit.  10/30/2024 Reports feeling well at the baseline of her health status, no changes in chronic conditions or new complaints since the last visit.

## 2024-11-25 NOTE — ASSESSMENT
[Palliative] : Goals of care discussed with patient: Palliative [Patient/Caregiver not ready to engage] : Patient/Caregiver not ready to engage [FreeTextEntry1] : # Malignant neoplasm of right breast initially staged IIIB ([id number]) diagnosed in 2014. - Briefly she has been treated with Taxotere, Pertuzumab and Herceptin, Herceptin maintenance, trastuzumab, exemestane, Talbert, Anastrozole, multiple radiation to the supraclavicular and chest wall as well, and now on TDM 1 with demonstrated response unfortunately course has been complicated by thrombocytopenia. She was previously on full dose 3.6 mg /kg but this has been on hold due to thrombocytopenia. She got total of 4 cycles prior transferring her care to us. - Ideally translation of records into English will be helpful. - 08/11/2023 restarted TDM-1 with Ado-trastuzumab emtansine (Kadcyla) at a lower dose level 3 mg/kg given the thrombocytopenia (in past) is on his platelet count is greater than 75,000 - as Cycle 5. - 09/27/2023 Ado-trastuzumab emtansine (Kadcyla) dose decrease to 2.4 mg/kg due to thrombocytopenia. - 11/08/2023 the patient reports right supraclavicular discomfort (similar as to when she progressed the last time). - 11/22/2023 PET - No definite sites of pathologic FDG uptake. - 02/20/2024 PET/CT - [facility]. - 02/2024 ECHO EF 67%. - 06/20/2024 ECHO LV Ejection Fraction EF of 66 %. - 09/12/2024 ECHO LV Ejection Fraction EF of 66 %. - 09/23/2024 CT C/A/P - Since July 11, 2024. No current CT evidence of active intrathoracic disease. Post therapeutic changes from right mastectomy, involving right upper lung field. No evidence of metastatic disease within the abdomen and pelvis.  # Moderate thrombocytopenia from treatment. - had dose reductions. - 12/20/2023 started NPlate 3 mg/kg with every treatment Q3W if PLT<50-60K. - 08/2024 INS denied continuation of Nplate and therefore we decided to switch to Promacta 25 mg PO QD. - 09/18/2024 unable to tolerate Promacta due to significant pruritus and therefore d/c and reinstitute Nplate at 1 mcg/kg.  # Mild LFTs likely form Kadcyla less likely from statin - stable - will monitor. - she has known gallstones as well with colic will see what CT shows.  11/20/2024 Labs reviewed and results discussed with the patient - remains clinically stable to continue current management. All questions were answered to satisfaction.  PLAN: - continue Ado-trastuzumab emtansine (Kadcyla) 2.4 mg/kg Q21D - C23 GIVE TODAY. - continue Nplate at 1 mcg/kg Q3W - GIVE TODAY. - repeat ECHOcardio if symptomatic or every 3-4 months - 12/2024. - repeat CT C/A/P for monitoring Q3M - 01/2025. - Labs today: CBC CMP TSH Ca15-3 RTC in 3 weeks with CBC CMP TSH Ca15-3 and same day treatment. [AdvancecareDate] : 12/20/2023

## 2024-11-25 NOTE — PHYSICAL EXAM
[Ambulatory and capable of all self care but unable to carry out any work activities] : Status 2- Ambulatory and capable of all self care but unable to carry out any work activities. Up and about more than 50% of waking hours [Normal] : affect appropriate [de-identified] : Uses cane for ambulation. [de-identified] : Old and well healed right breast mastectomy scar. [de-identified] : Decreased range of motion in her right shoulder status post right-sided mastectomy.

## 2024-11-25 NOTE — ASSESSMENT
[Palliative] : Goals of care discussed with patient: Palliative [Patient/Caregiver not ready to engage] : Patient/Caregiver not ready to engage [FreeTextEntry1] : # Malignant neoplasm of right breast initially staged IIIB ([id number]) diagnosed in 2014. - Briefly she has been treated with Taxotere, Pertuzumab and Herceptin, Herceptin maintenance, trastuzumab, exemestane, Talbert, Anastrozole, multiple radiation to the supraclavicular and chest wall as well, and now on TDM 1 with demonstrated response unfortunately course has been complicated by thrombocytopenia. She was previously on full dose 3.6 mg /kg but this has been on hold due to thrombocytopenia. She got total of 4 cycles prior transferring her care to us. - Ideally translation of records into English will be helpful. - 08/11/2023 restarted TDM-1 with Ado-trastuzumab emtansine (Kadcyla) at a lower dose level 3 mg/kg given the thrombocytopenia (in past) is on his platelet count is greater than 75,000 - as Cycle 5. - 09/27/2023 Ado-trastuzumab emtansine (Kadcyla) dose decrease to 2.4 mg/kg due to thrombocytopenia. - 11/08/2023 the patient reports right supraclavicular discomfort (similar as to when she progressed the last time). - 11/22/2023 PET - No definite sites of pathologic FDG uptake. - 02/20/2024 PET/CT - [facility]NE. - 02/2024 ECHO EF 67%. - 06/20/2024 ECHO LV Ejection Fraction EF of 66 %. - 09/12/2024 ECHO LV Ejection Fraction EF of 66 %. - 09/23/2024 CT C/A/P - Since July 11, 2024. No current CT evidence of active intrathoracic disease. Post therapeutic changes from right mastectomy, involving right upper lung field. No evidence of metastatic disease within the abdomen and pelvis.  # Moderate thrombocytopenia from treatment. - had dose reductions. - 12/20/2023 started NPlate 3 mg/kg with every treatment Q3W if PLT<50-60K. - 08/2024 INS denied continuation of Nplate and therefore we decided to switch to Promacta 25 mg PO QD. - 09/18/2024 unable to tolerate Promacta due to significant pruritus and therefore d/c and reinstitute Nplate at 1 mcg/kg.  # Mild LFTs likely form Kadcyla less likely from statin - stable - will monitor. - she has known gallstones as well with colic will see what CT shows.  11/20/2024 Labs reviewed and results discussed with the patient - remains clinically stable to continue current management. All questions were answered to satisfaction.  PLAN: - continue Ado-trastuzumab emtansine (Kadcyla) 2.4 mg/kg Q21D - C23 GIVE TODAY. - continue Nplate at 1 mcg/kg Q3W - GIVE TODAY. - repeat ECHOcardio if symptomatic or every 3-4 months - 12/2024. - repeat CT C/A/P for monitoring Q3M - 01/2025. - Labs today: CBC CMP TSH Ca15-3 RTC in 3 weeks with CBC CMP TSH Ca15-3 and same day treatment. [AdvancecareDate] : 12/20/2023

## 2024-11-25 NOTE — HISTORY OF PRESENT ILLNESS
[Disease: _____________________] : Disease: [unfilled] [AJCC Stage: ____] : AJCC Stage: [unfilled] [de-identified] : CC: Here to continue her treatment for right-sided breast cancer.  This is a very pleasant 80-year-old female she has a history of recurrent ER positive, VA negative, HER2 positive breast cancer.  She was treated as below.  Please note records were in Spanish and I did my best with my nurse practitioner to interpret the results but they are aware that English translation as needed.  07/2023 She relocated from [place] to continue her treatment here in the [facility].  The following is a summary of past treatments that she did in [place], [place] and [place]: 2013 she noticed hardness in her right breast, but biopsy revealed only fatty tissue and her mammogram was negative. 10/10/2014 Initially diagnosed with right breast carcinoma stage IIIb ([id number]) with right supraclavicular lymph nodes involvement.  Biopsy revealed:  11/23/2014-04/25/2015 completed 6 courses of pertuzumab, trastuzumab and docetaxel. 04/25/2025-11/10/2015 completed 6 cycles of Herceptin IV. 04/2015 started tamoxifen 1 tab daily.  05/20/2015 underwent right mastectomy with lymph nodes excision. 08/26//2015 radiation treatment to right breast. 11/16/2016 PET - progression in right supraclavicular lymph node. 01/20/2017 PET - progression in right supraclavicular lymph node increased in size. 01/20/2017 completed a course of tamoxifen. 01/2017 noted with progression in right subclavicular lymph node. Her treatment was changed to trastuzumab and anastrozole and she got it from 09/20/2017 to 08/20/2017.  which later was changed to exemestane, and she continued on it till 2019 when she was noted with progression to lymph nodes.  Again, she had biopsy, radiation and changed to a different aromatase inhibitor.  08/14/2020 again progression in the lymph nodes of her right neck.  09/03/2020 lymph node biopsy revealed: poorly differentiated carcinoma. 09/2020 started on trastuzumab and exemestane. 05/2021 she had radiation treatment to right supraclavicular area. 06/2022 and 12/2022 PET revealed positive response to the treatment and decrease FGD uptake in lymph nodes on both sides of her neck, right supraclavicular and mediastinal.  and started Kadcyla 3.6 mg/kg.  She received 5 treatments and had PET scan which showed response to the treatment.  She had 2 additional treatments after that and tolerated it well. Her last treatment was on 06/08/2023 and then postponed due to thrombocytopenia. [de-identified] : ER+Her2+ ID -0 [de-identified] : 09/01/2023 accompanied by her son; Reports feeling well, no changes in chronic conditions or new complaints since the last visit.  09/06/2023 accompanied by her son; Reports feeling well, but noticed with increasing bruising.  9/27/23 She is here for follow up. She is due for cycle 3 in [facility] but cycle 7 total of Kadcyla. PLT today are 64,000 She has some bruising but no major bleeding.  10/18/2023 accompanied by son; Reports feeling well, but with difficulties to control her BP as she is watching news; no other changes in chronic conditions or new complaints since the last visit.  11/08/2023 accompanied by her daughter-in-law; Reports feeling well, but has discomfort at right neck area "where were previous lymph nodes before the last progression".  11/29/2023 accompanied by her son; Reports feeling well, no changes in chronic conditions or new complaints since the last visit. She had PET/CT on 11/22/23 [facility]. CBC today with stable mild thrombocytopenia. Due for cycle 6 of Kadcyla today.  12/20/2023 accompanied by her son; Reports feeling well, no changes in chronic conditions or new complaints since the last visit.  01/10/2024 accompanied by her son; Reports feeling well now, but had RUQ discomfort for 1 week post last treatment - all resolved now.  01/31/2024 accompanied by her daughter-in-law; Reports feeling well, but with RUQ discomfort for a few days after the last treatment - self-resolved.  2/21/24 She is here for follow-up. She is due for cycle 10 of Kadcyla CBC is stable with . She had a PET.CT on 2/20/24: IMPRESSION: 1. Since November 21, 2023, no new site of pathologic FDG uptake to suggest biologic tumor activity.  4/3/2024 She is here for follow up. She is due for cycle 12 of Kadcyla today CBC is with PLT 74. She feels well. had normal dexa.  04/24/2024 accompanied by her son; Reports feeling well at the baseline of her health status, no changes in chronic conditions or new complaints since the last visit.  6/5/24 She is here for follow up. She has been having some RUQ pain. LFTs stable give or take maybe Gallstones. CBC today stable with PLT 89; No other issues.  06/26/2024 accompanied by her son; Reports feeling well at the baseline of her health status, no changes in chronic conditions or new complaints since the last visit. Recent echo with LVEF 66%.  07/17/2024 accompanied by her daughter-in-law; Reports feeling well at the baseline of her health status, no changes in chronic conditions or new complaints since the last visit. She has whitecoat syndrome and her blood pressure goes up.  08/07/2024 Reports feeling well at the baseline of her health status, no changes in chronic conditions or new complaints since the last visit.  08/28/2024 Reports feeling well at the baseline of her health status, no changes in chronic conditions or new complaints since the last visit. She did not start Promacta yet, but has it ready at home as we decided to see what her PLT level will be today.  09/18/2024 She is here for F/U and treatment. We tried to switch Nplate to Promacta, but she developed significant generalized pruritus, and we stopped it. She took Promacta for a few days only. She feels well now, and pruritus resolved.  10/08/2024 Reports feeling well at the baseline of her health status, no changes in chronic conditions or new complaints since the last visit.  10/30/2024 Reports feeling well at the baseline of her health status, no changes in chronic conditions or new complaints since the last visit.  11/20/2024 Reports recently fell in the park during her power walk. She has a lot of black and blues that are resolving now. At this time, today, she is back to baseline of her health status.

## 2024-11-25 NOTE — HISTORY OF PRESENT ILLNESS
[Disease: _____________________] : Disease: [unfilled] [AJCC Stage: ____] : AJCC Stage: [unfilled] [de-identified] : CC: Here to continue her treatment for right-sided breast cancer.  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  She was treated as below.  Please note records were in [place] and I did my best with my nurse practitioner to interpret the results but they are aware that English translation as needed.  07/2023 She relocated from [place] to continue her treatment here in the [facility].  The following is a summary of past treatments that she did in [place], [place] and [place]: 2013 she noticed hardness in her right breast, but biopsy revealed only fatty tissue and her mammogram was negative. 10/10/2014 Initially diagnosed with right breast carcinoma stage IIIb ([id number]) with right supraclavicular lymph nodes involvement.  Biopsy revealed:  11/23/2014-04/25/2015 completed 6 courses of pertuzumab, trastuzumab and docetaxel. 04/25/2025-11/10/2015 completed 6 cycles of Herceptin IV. 04/2015 started tamoxifen 1 tab daily.  05/20/2015 underwent right mastectomy with lymph nodes excision. 08/26//2015 radiation treatment to right breast. 11/16/2016 PET - progression in right supraclavicular lymph node. 01/20/2017 PET - progression in right supraclavicular lymph node increased in size. 01/20/2017 completed a course of tamoxifen. 01/2017 noted with progression in right subclavicular lymph node. Her treatment was changed to trastuzumab and anastrozole and she got it from 09/20/2017 to 08/20/2017.  which later was changed to exemestane, and she continued on it till 2019 when she was noted with progression to lymph nodes.  Again, she had biopsy, radiation and changed to a different aromatase inhibitor.  08/14/2020 again progression in the lymph nodes of her right neck.  09/03/2020 lymph node biopsy revealed: poorly differentiated carcinoma. 09/2020 started on trastuzumab and exemestane. 05/2021 she had radiation treatment to right supraclavicular area. 06/2022 and 12/2022 PET revealed positive response to the treatment and decrease FGD uptake in lymph nodes on both sides of her neck, right supraclavicular and mediastinal.  and started Kadcyla 3.6 mg/kg.  She received 5 treatments and had PET scan which showed response to the treatment.  She had 2 additional treatments after that and tolerated it well. Her last treatment was on 06/08/2023 and then postponed due to thrombocytopenia. [de-identified] : ER+Her2+ CT -0 [de-identified] : 09/01/2023 accompanied by her son; Reports feeling well, no changes in chronic conditions or new complaints since the last visit.  09/06/2023 accompanied by her son; Reports feeling well, but noticed with increasing bruising.  9/27/23 She is here for follow up. She is due for cycle 3 in [facility] but cycle 7 total of Kadcyla. PLT today are 64,000 She has some bruising but no major bleeding.  10/18/2023 accompanied by son; Reports feeling well, but with difficulties to control her BP as she is watching news; no other changes in chronic conditions or new complaints since the last visit.  11/08/2023 accompanied by her daughter-in-law; Reports feeling well, but has discomfort at right neck area "where were previous lymph nodes before the last progression".  11/29/2023 accompanied by her son; Reports feeling well, no changes in chronic conditions or new complaints since the last visit. She had PET/CT on 11/22/23 [facility]. CBC today with stable mild thrombocytopenia. Due for cycle 6 of Kadcyla today.  12/20/2023 accompanied by her son; Reports feeling well, no changes in chronic conditions or new complaints since the last visit.  01/10/2024 accompanied by her son; Reports feeling well now, but had RUQ discomfort for 1 week post last treatment - all resolved now.  01/31/2024 accompanied by her daughter-in-law; Reports feeling well, but with RUQ discomfort for a few days after the last treatment - self-resolved.  2/21/24 She is here for follow-up. She is due for cycle 10 of Kadcyla CBC is stable with . She had a PET.CT on 2/20/24: IMPRESSION: 1. Since November 21, 2023, no new site of pathologic FDG uptake to suggest biologic tumor activity.  4/3/2024 She is here for follow up. She is due for cycle 12 of Kadcyla today CBC is with PLT 74. She feels well. had normal dexa.  04/24/2024 accompanied by her son; Reports feeling well at the baseline of her health status, no changes in chronic conditions or new complaints since the last visit.  6/5/24 She is here for follow up. She has been having some RUQ pain. LFTs stable give or take maybe Gallstones. CBC today stable with PLT 89; No other issues.  06/26/2024 accompanied by her son; Reports feeling well at the baseline of her health status, no changes in chronic conditions or new complaints since the last visit. Recent echo with LVEF 66%.  07/17/2024 accompanied by her daughter-in-law; Reports feeling well at the baseline of her health status, no changes in chronic conditions or new complaints since the last visit. She has whitecoat syndrome and her blood pressure goes up.  08/07/2024 Reports feeling well at the baseline of her health status, no changes in chronic conditions or new complaints since the last visit.  08/28/2024 Reports feeling well at the baseline of her health status, no changes in chronic conditions or new complaints since the last visit. She did not start Promacta yet, but has it ready at home as we decided to see what her PLT level will be today.  09/18/2024 She is here for F/U and treatment. We tried to switch Nplate to Promacta, but she developed significant generalized pruritus, and we stopped it. She took Promacta for a few days only. She feels well now, and pruritus resolved.  10/08/2024 Reports feeling well at the baseline of her health status, no changes in chronic conditions or new complaints since the last visit.  10/30/2024 Reports feeling well at the baseline of her health status, no changes in chronic conditions or new complaints since the last visit.  11/20/2024 Reports recently fell in the park during her power walk. She has a lot of black and blues that are resolving now. At this time, today, she is back to baseline of her health status.

## 2024-11-25 NOTE — PHYSICAL EXAM
[Ambulatory and capable of all self care but unable to carry out any work activities] : Status 2- Ambulatory and capable of all self care but unable to carry out any work activities. Up and about more than 50% of waking hours [Normal] : affect appropriate [de-identified] : Uses cane for ambulation. [de-identified] : Old and well healed right breast mastectomy scar. [de-identified] : Decreased range of motion in her right shoulder status post right-sided mastectomy.

## 2024-11-25 NOTE — END OF VISIT
[FreeTextEntry3] : I was physically present for the key portions of the evaluation and management service provided.  I agree with the history and physical, and plan which I have reviewed and edited where appropriate.  She is here for follow-up she is doing well remains on Nplate and Kadcyla blood work was done today next imaging in January we will monitor close well every 3 to 4 months

## 2024-12-11 NOTE — HISTORY OF PRESENT ILLNESS
[Disease: _____________________] : Disease: [unfilled] [AJCC Stage: ____] : AJCC Stage: [unfilled] [de-identified] : CC: Here to continue her treatment for right-sided breast cancer.  This is a very pleasant 80-year-old female she has a history of recurrent ER positive, ME negative, HER2 positive breast cancer.  She was treated as below.  Please note records were in [place] and I did my best with my nurse practitioner to interpret the results but they are aware that English translation as needed.  07/2023 She relocated from [place] to continue her treatment here in the [facility].  The following is a summary of past treatments that she did in [place], [place] and [place]: 2013 she noticed hardness in her right breast, but biopsy revealed only fatty tissue and her mammogram was negative. 10/10/2014 Initially diagnosed with right breast carcinoma stage IIIb ([id number]) with right supraclavicular lymph nodes involvement.  Biopsy revealed:  11/23/2014-04/25/2015 completed 6 courses of pertuzumab, trastuzumab and docetaxel. 04/25/2025-11/10/2015 completed 6 cycles of Herceptin IV. 04/2015 started tamoxifen 1 tab daily.  05/20/2015 underwent right mastectomy with lymph nodes excision. 08/26//2015 radiation treatment to right breast. 11/16/2016 PET - progression in right supraclavicular lymph node. 01/20/2017 PET - progression in right supraclavicular lymph node increased in size. 01/20/2017 completed a course of tamoxifen. 01/2017 noted with progression in right subclavicular lymph node. Her treatment was changed to trastuzumab and anastrozole and she got it from 09/20/2017 to 08/20/2017.  which later was changed to exemestane, and she continued on it till 2019 when she was noted with progression to lymph nodes.  Again, she had biopsy, radiation and changed to a different aromatase inhibitor.  08/14/2020 again progression in the lymph nodes of her right neck.  09/03/2020 lymph node biopsy revealed: poorly differentiated carcinoma. 09/2020 started on trastuzumab and exemestane. 05/2021 she had radiation treatment to right supraclavicular area. 06/2022 and 12/2022 PET revealed positive response to the treatment and decrease FGD uptake in lymph nodes on both sides of her neck, right supraclavicular and mediastinal.  and started Kadcyla 3.6 mg/kg.  She received 5 treatments and had PET scan which showed response to the treatment.  She had 2 additional treatments after that and tolerated it well. Her last treatment was on 06/08/2023 and then postponed due to thrombocytopenia. [de-identified] : ER+Her2+ OK -0 [de-identified] : 09/01/2023 accompanied by her son; Reports feeling well, no changes in chronic conditions or new complaints since the last visit.  09/06/2023 accompanied by her son; Reports feeling well, but noticed with increasing bruising.  9/27/23 She is here for follow up. She is due for cycle 3 in [facility] but cycle 7 total of Kadcyla. PLT today are 64,000 She has some bruising but no major bleeding.  10/18/2023 accompanied by son; Reports feeling well, but with difficulties to control her BP as she is watching news; no other changes in chronic conditions or new complaints since the last visit.  11/08/2023 accompanied by her daughter-in-law; Reports feeling well, but has discomfort at right neck area "where were previous lymph nodes before the last progression".  11/29/2023 accompanied by her son; Reports feeling well, no changes in chronic conditions or new complaints since the last visit. She had PET/CT on 11/22/23 [facility]. CBC today with stable mild thrombocytopenia. Due for cycle 6 of Kadcyla today.  12/20/2023 accompanied by her son; Reports feeling well, no changes in chronic conditions or new complaints since the last visit.  01/10/2024 accompanied by her son; Reports feeling well now, but had RUQ discomfort for 1 week post last treatment - all resolved now.  01/31/2024 accompanied by her daughter-in-law; Reports feeling well, but with RUQ discomfort for a few days after the last treatment - self-resolved.  2/21/24 She is here for follow-up. She is due for cycle 10 of Kadcyla CBC is stable with . She had a PET.CT on 2/20/24: IMPRESSION: 1. Since November 21, 2023, no new site of pathologic FDG uptake to suggest biologic tumor activity.  4/3/2024 She is here for follow up. She is due for cycle 12 of Kadcyla today CBC is with PLT 74. She feels well. had normal dexa.  04/24/2024 accompanied by her son; Reports feeling well at the baseline of her health status, no changes in chronic conditions or new complaints since the last visit.  6/5/24 She is here for follow up. She has been having some RUQ pain. LFTs stable give or take maybe Gallstones. CBC today stable with PLT 89; No other issues.  06/26/2024 accompanied by her son; Reports feeling well at the baseline of her health status, no changes in chronic conditions or new complaints since the last visit. Recent echo with LVEF 66%.  07/17/2024 accompanied by her daughter-in-law; Reports feeling well at the baseline of her health status, no changes in chronic conditions or new complaints since the last visit. She has whitecoat syndrome and her blood pressure goes up.  08/07/2024 Reports feeling well at the baseline of her health status, no changes in chronic conditions or new complaints since the last visit.  08/28/2024 Reports feeling well at the baseline of her health status, no changes in chronic conditions or new complaints since the last visit. She did not start Promacta yet, but has it ready at home as we decided to see what her PLT level will be today.  09/18/2024 She is here for F/U and treatment. We tried to switch Nplate to Promacta, but she developed significant generalized pruritus, and we stopped it. She took Promacta for a few days only. She feels well now, and pruritus resolved.  10/08/2024 Reports feeling well at the baseline of her health status, no changes in chronic conditions or new complaints since the last visit.  10/30/2024 Reports feeling well at the baseline of her health status, no changes in chronic conditions or new complaints since the last visit.  11/20/2024 Reports recently fell in the park during her power walk. She has a lot of black and blues that are resolving now. At this time, today, she is back to baseline of her health status.  12/11/2024 Reports feeling well at the baseline of her health status, no changes in chronic conditions or new complaints since the last visit.

## 2024-12-11 NOTE — PHYSICAL EXAM
[Ambulatory and capable of all self care but unable to carry out any work activities] : Status 2- Ambulatory and capable of all self care but unable to carry out any work activities. Up and about more than 50% of waking hours [Normal] : affect appropriate [de-identified] : Uses cane for ambulation. [de-identified] : Old and well healed right breast mastectomy scar. [de-identified] : Decreased range of motion in her right shoulder status post right-sided mastectomy.

## 2024-12-11 NOTE — ASSESSMENT
[Palliative] : Goals of care discussed with patient: Palliative [Patient/Caregiver not ready to engage] : Patient/Caregiver not ready to engage [FreeTextEntry1] : # Malignant neoplasm of right breast initially staged IIIB ([id number]) diagnosed in 2014. - Briefly she has been treated with Taxotere, Pertuzumab and Herceptin, Herceptin maintenance, trastuzumab, exemestane, Talbert, Anastrozole, multiple radiation to the supraclavicular and chest wall as well, and now on TDM 1 with demonstrated response unfortunately course has been complicated by thrombocytopenia. She was previously on full dose 3.6 mg /kg but this has been on hold due to thrombocytopenia. She got total of 4 cycles prior transferring her care to us. - Ideally translation of records into English will be helpful. - 08/11/2023 restarted TDM-1 with Ado-trastuzumab emtansine (Kadcyla) at a lower dose level 3 mg/kg given the thrombocytopenia (in past) is on his platelet count is greater than 75,000 - as Cycle 5. - 09/27/2023 Ado-trastuzumab emtansine (Kadcyla) dose decrease to 2.4 mg/kg due to thrombocytopenia. - 11/08/2023 the patient reports right supraclavicular discomfort (similar as to when she progressed the last time). - 11/22/2023 PET - No definite sites of pathologic FDG uptake. - 02/20/2024 PET/CT - [facility]. - 02/2024 ECHO EF 67%. - 06/20/2024 ECHO LV Ejection Fraction EF of 66 %. - 09/12/2024 ECHO LV Ejection Fraction EF of 66 %. - 09/23/2024 CT C/A/P - Since July 11, 2024. No current CT evidence of active intrathoracic disease. Post therapeutic changes from right mastectomy, involving right upper lung field. No evidence of metastatic disease within the abdomen and pelvis.  # Moderate thrombocytopenia from treatment. - had dose reductions. - 12/20/2023 started NPlate 3 mg/kg with every treatment Q3W if PLT<50-60K. - 08/2024 INS denied continuation of Nplate and therefore we decided to switch to Promacta 25 mg PO QD. - 09/18/2024 unable to tolerate Promacta due to significant pruritus and therefore d/c and reinstitute Nplate at 1 mcg/kg.  # Mild LFTs likely form Kadcyla less likely from statin - stable - will monitor. - she has known gallstones as well with colic will see what CT shows.  12/11/2024 Labs reviewed and results discussed with the patient - remains clinically stable to continue current management. All questions were answered to satisfaction.  PLAN: - continue Ado-trastuzumab emtansine (Kadcyla) 2.4 mg/kg Q21D - C24 GIVE TODAY. - continue Nplate at 1 mcg/kg Q3W - GIVE TODAY. - repeat ECHOcardio if symptomatic or every 3-4 months - 12/2024 - ordered. - repeat CT C/A/P for monitoring Q3M - 01/2025 - ordered. - Labs today: CBC CMP TSH Ca15-3 RTC in 3 weeks with CBC CMP TSH Ca15-3 and same day treatment. [AdvancecareDate] : 12/20/2023

## 2025-01-02 NOTE — PHYSICAL EXAM
[Ambulatory and capable of all self care but unable to carry out any work activities] : Status 2- Ambulatory and capable of all self care but unable to carry out any work activities. Up and about more than 50% of waking hours [Normal] : affect appropriate [de-identified] : Uses cane for ambulation. [de-identified] : Old and well healed right breast mastectomy scar. [de-identified] : Decreased range of motion in her right shoulder status post right-sided mastectomy.

## 2025-01-02 NOTE — ASSESSMENT
[Palliative] : Goals of care discussed with patient: Palliative [Patient/Caregiver not ready to engage] : Patient/Caregiver not ready to engage [FreeTextEntry1] : # Malignant neoplasm of right breast initially staged IIIB ([id number]) diagnosed in 2014. - Briefly she has been treated with Taxotere, Pertuzumab and Herceptin, Herceptin maintenance, trastuzumab, exemestane, Talbert, Anastrozole, multiple radiation to the supraclavicular and chest wall as well, and now on TDM 1 with demonstrated response unfortunately course has been complicated by thrombocytopenia. She was previously on full dose 3.6 mg /kg but this has been on hold due to thrombocytopenia. She got total of 4 cycles prior transferring her care to us. - Ideally translation of records into English will be helpful. - 08/11/2023 restarted TDM-1 with Ado-trastuzumab emtansine (Kadcyla) at a lower dose level 3 mg/kg given the thrombocytopenia (in past) is on his platelet count is greater than 75,000 - as Cycle 5. - 09/27/2023 Ado-trastuzumab emtansine (Kadcyla) dose decrease to 2.4 mg/kg due to thrombocytopenia. - 11/08/2023 the patient reports right supraclavicular discomfort (similar as to when she progressed the last time). - 11/22/2023 PET - No definite sites of pathologic FDG uptake. - 02/20/2024 PET/CT - [facility]. - 02/2024 ECHO EF 67%. - 06/20/2024 ECHO LV Ejection Fraction EF of 66 %. - 09/12/2024 ECHO LV Ejection Fraction EF of 66 %. - 09/23/2024 CT C/A/P - Since July 11, 2024. No current CT evidence of active intrathoracic disease. Post therapeutic changes from right mastectomy, involving right upper lung field. No evidence of metastatic disease within the abdomen and pelvis.  # Moderate thrombocytopenia from treatment. - had dose reductions. - 12/20/2023 started NPlate 3 mg/kg with every treatment Q3W if PLT<50-60K. - 08/2024 INS denied continuation of Nplate and therefore we decided to switch to Promacta 25 mg PO QD. - 09/18/2024 unable to tolerate Promacta due to significant pruritus and therefore d/c and reinstitute Nplate at 1 mcg/kg.  # Mild LFTs likely form Kadcyla less likely from statin - stable - will monitor. - she has known gallstones as well with colic will see what CT shows.   PLAN: - continue Ado-trastuzumab emtansine (Kadcyla) 2.4 mg/kg Q21D - C25 GIVE TODAY. - continue Nplate at 1 mcg/kg Q3W - GIVE TODAY. Plts stable at 82 - repeat ECHOcardio if symptomatic or every 3-4 months - 12/2024 - ordered. - repeat CT C/A/P for monitoring Q3M  - pending CA 15-3 went up from 40s to 100 CT C/A/P 12/26/24 LYNNE  next CT due 3/2025 or Early April - Labs today: CBC CMP TSH Ca15-3 RTC in 3 weeks with CBC CMP TSH Ca15-3 and same day treatment. [AdvancecareDate] : 12/20/2023

## 2025-01-02 NOTE — HISTORY OF PRESENT ILLNESS
[Disease: _____________________] : Disease: [unfilled] [AJCC Stage: ____] : AJCC Stage: [unfilled] [de-identified] : CC: Here to continue her treatment for right-sided breast cancer.  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  She was treated as below.  Please note records were in [place] and I did my best with my nurse practitioner to interpret the results but they are aware that English translation as needed.  07/2023 She relocated from [place] to continue her treatment here in the [facility].  The following is a summary of past treatments that she did in [place], [place] and [place]: 2013 she noticed hardness in her right breast, but biopsy revealed only fatty tissue and her mammogram was negative. 10/10/2014 Initially diagnosed with right breast carcinoma stage IIIb ([id number]) with right supraclavicular lymph nodes involvement.  Biopsy revealed:  11/23/2014-04/25/2015 completed 6 courses of pertuzumab, trastuzumab and docetaxel. 04/25/2025-11/10/2015 completed 6 cycles of Herceptin IV. 04/2015 started tamoxifen 1 tab daily.  05/20/2015 underwent right mastectomy with lymph nodes excision. 08/26//2015 radiation treatment to right breast. 11/16/2016 PET - progression in right supraclavicular lymph node. 01/20/2017 PET - progression in right supraclavicular lymph node increased in size. 01/20/2017 completed a course of tamoxifen. 01/2017 noted with progression in right subclavicular lymph node. Her treatment was changed to trastuzumab and anastrozole and she got it from 09/20/2017 to 08/20/2017.  which later was changed to exemestane, and she continued on it till 2019 when she was noted with progression to lymph nodes.  Again, she had biopsy, radiation and changed to a different aromatase inhibitor.  08/14/2020 again progression in the lymph nodes of her right neck.  09/03/2020 lymph node biopsy revealed: poorly differentiated carcinoma. 09/2020 started on trastuzumab and exemestane. 05/2021 she had radiation treatment to right supraclavicular area. 06/2022 and 12/2022 PET revealed positive response to the treatment and decrease FGD uptake in lymph nodes on both sides of her neck, right supraclavicular and mediastinal.  and started Kadcyla 3.6 mg/kg.  She received 5 treatments and had PET scan which showed response to the treatment.  She had 2 additional treatments after that and tolerated it well. Her last treatment was on 06/08/2023 and then postponed due to thrombocytopenia. [de-identified] : ER+Her2+ [place] -0 [de-identified] : 09/01/2023 accompanied by her son; Reports feeling well, no changes in chronic conditions or new complaints since the last visit.  09/06/2023 accompanied by her son; Reports feeling well, but noticed with increasing bruising.  9/27/23 She is here for follow up. She is due for cycle 3 in [facility] but cycle 7 total of Kadcyla. PLT today are 64,000 She has some bruising but no major bleeding.  10/18/2023 accompanied by son; Reports feeling well, but with difficulties to control her BP as she is watching news; no other changes in chronic conditions or new complaints since the last visit.  11/08/2023 accompanied by her daughter-in-law; Reports feeling well, but has discomfort at right neck area "where were previous lymph nodes before the last progression".  11/29/2023 accompanied by her son; Reports feeling well, no changes in chronic conditions or new complaints since the last visit. She had PET/CT on 11/22/23 [facility]. CBC today with stable mild thrombocytopenia. Due for cycle 6 of Kadcyla today.  12/20/2023 accompanied by her son; Reports feeling well, no changes in chronic conditions or new complaints since the last visit.  01/10/2024 accompanied by her son; Reports feeling well now, but had RUQ discomfort for 1 week post last treatment - all resolved now.  01/31/2024 accompanied by her daughter-in-law; Reports feeling well, but with RUQ discomfort for a few days after the last treatment - self-resolved.  2/21/24 She is here for follow-up. She is due for cycle 10 of Kadcyla CBC is stable with . She had a PET.CT on 2/20/24: IMPRESSION: 1. Since November 21, 2023, no new site of pathologic FDG uptake to suggest biologic tumor activity.  4/3/2024 She is here for follow up. She is due for cycle 12 of Kadcyla today CBC is with PLT 74. She feels well. had normal dexa.  04/24/2024 accompanied by her son; Reports feeling well at the baseline of her health status, no changes in chronic conditions or new complaints since the last visit.  6/5/24 She is here for follow up. She has been having some RUQ pain. LFTs stable give or take maybe Gallstones. CBC today stable with PLT 89; No other issues.  06/26/2024 accompanied by her son; Reports feeling well at the baseline of her health status, no changes in chronic conditions or new complaints since the last visit. Recent echo with LVEF 66%.  07/17/2024 accompanied by her daughter-in-law; Reports feeling well at the baseline of her health status, no changes in chronic conditions or new complaints since the last visit. She has whitecoat syndrome and her blood pressure goes up.  08/07/2024 Reports feeling well at the baseline of her health status, no changes in chronic conditions or new complaints since the last visit.  08/28/2024 Reports feeling well at the baseline of her health status, no changes in chronic conditions or new complaints since the last visit. She did not start Promacta yet, but has it ready at home as we decided to see what her PLT level will be today.  09/18/2024 She is here for F/U and treatment. We tried to switch Nplate to Promacta, but she developed significant generalized pruritus, and we stopped it. She took Promacta for a few days only. She feels well now, and pruritus resolved.  10/08/2024 Reports feeling well at the baseline of her health status, no changes in chronic conditions or new complaints since the last visit.  10/30/2024 Reports feeling well at the baseline of her health status, no changes in chronic conditions or new complaints since the last visit.  11/20/2024 Reports recently fell in the park during her power walk. She has a lot of black and blues that are resolving now. At this time, today, she is back to baseline of her health status.  12/11/2024 Reports feeling well at the baseline of her health status, no changes in chronic conditions or new complaints since the last visit. She is doing well reports gum bleeding I recommended she see a dentist and also very mild epistaxis at times does have dry nose dry mouth has forced air likely from lack of humidity recommended bacitracin into nostrils.  CBC is otherwise stable

## 2025-01-22 NOTE — HISTORY OF PRESENT ILLNESS
[Disease: _____________________] : Disease: [unfilled] [AJCC Stage: ____] : AJCC Stage: [unfilled] [de-identified] : CC: Here to continue her treatment for right-sided breast cancer.  This is a very pleasant 80-year-old female she has a history of recurrent ER positive, [place] negative, HER2 positive breast cancer.  She was treated as below.  Please note records were in [place] and I did my best with my nurse practitioner to interpret the results but they are aware that English translation as needed.  07/2023 She relocated from [place] to continue her treatment here in the [facility].  The following is a summary of past treatments that she did in [place], [place] and [place]: 2013 she noticed hardness in her right breast, but biopsy revealed only fatty tissue and her mammogram was negative. 10/10/2014 Initially diagnosed with right breast carcinoma stage IIIb ([id number]) with right supraclavicular lymph nodes involvement.  Biopsy revealed:  11/23/2014-04/25/2015 completed 6 courses of pertuzumab, trastuzumab and docetaxel. 04/25/2025-11/10/2015 completed 6 cycles of Herceptin IV. 04/2015 started tamoxifen 1 tab daily.  05/20/2015 underwent right mastectomy with lymph nodes excision. 08/26//2015 radiation treatment to right breast. 11/16/2016 PET - progression in right supraclavicular lymph node. 01/20/2017 PET - progression in right supraclavicular lymph node increased in size. 01/20/2017 completed a course of tamoxifen. 01/2017 noted with progression in right subclavicular lymph node. Her treatment was changed to trastuzumab and anastrozole and she got it from 09/20/2017 to 08/20/2017.  which later was changed to exemestane, and she continued on it till 2019 when she was noted with progression to lymph nodes.  Again, she had biopsy, radiation and changed to a different aromatase inhibitor.  08/14/2020 again progression in the lymph nodes of her right neck.  09/03/2020 lymph node biopsy revealed: poorly differentiated carcinoma. 09/2020 started on trastuzumab and exemestane. 05/2021 she had radiation treatment to right supraclavicular area. 06/2022 and 12/2022 PET revealed positive response to the treatment and decrease FGD uptake in lymph nodes on both sides of her neck, right supraclavicular and mediastinal.  and started Kadcyla 3.6 mg/kg.  She received 5 treatments and had PET scan which showed response to the treatment.  She had 2 additional treatments after that and tolerated it well. Her last treatment was on 06/08/2023 and then postponed due to thrombocytopenia. [de-identified] : ER+Her2+ [place] -0 [de-identified] : 09/01/2023 accompanied by her son; Reports feeling well, no changes in chronic conditions or new complaints since the last visit.  09/06/2023 accompanied by her son; Reports feeling well, but noticed with increasing bruising.  9/27/23 She is here for follow up. She is due for cycle 3 in [facility] but cycle 7 total of Kadcyla. PLT today are 64,000 She has some bruising but no major bleeding.  10/18/2023 accompanied by son; Reports feeling well, but with difficulties to control her BP as she is watching news; no other changes in chronic conditions or new complaints since the last visit.  11/08/2023 accompanied by her daughter-in-law; Reports feeling well, but has discomfort at right neck area "where were previous lymph nodes before the last progression".  11/29/2023 accompanied by her son; Reports feeling well, no changes in chronic conditions or new complaints since the last visit. She had PET/CT on 11/22/23 [facility]. CBC today with stable mild thrombocytopenia. Due for cycle 6 of Kadcyla today.  12/20/2023 accompanied by her son; Reports feeling well, no changes in chronic conditions or new complaints since the last visit.  01/10/2024 accompanied by her son; Reports feeling well now, but had RUQ discomfort for 1 week post last treatment - all resolved now.  01/31/2024 accompanied by her daughter-in-law; Reports feeling well, but with RUQ discomfort for a few days after the last treatment - self-resolved.  2/21/24 She is here for follow-up. She is due for cycle 10 of Kadcyla CBC is stable with . She had a PET.CT on 2/20/24: IMPRESSION: 1. Since November 21, 2023, no new site of pathologic FDG uptake to suggest biologic tumor activity.  4/3/2024 She is here for follow up. She is due for cycle 12 of Kadcyla today CBC is with PLT 74. She feels well. had normal dexa.  04/24/2024 accompanied by her son; Reports feeling well at the baseline of her health status, no changes in chronic conditions or new complaints since the last visit.  6/5/24 She is here for follow up. She has been having some RUQ pain. LFTs stable give or take maybe Gallstones. CBC today stable with PLT 89; No other issues.  06/26/2024 accompanied by her son; Reports feeling well at the baseline of her health status, no changes in chronic conditions or new complaints since the last visit. Recent echo with LVEF 66%.  07/17/2024 accompanied by her daughter-in-law; Reports feeling well at the baseline of her health status, no changes in chronic conditions or new complaints since the last visit. She has whitecoat syndrome and her blood pressure goes up.  08/07/2024 Reports feeling well at the baseline of her health status, no changes in chronic conditions or new complaints since the last visit.  08/28/2024 Reports feeling well at the baseline of her health status, no changes in chronic conditions or new complaints since the last visit. She did not start Promacta yet, but has it ready at home as we decided to see what her PLT level will be today.  09/18/2024 She is here for F/U and treatment. We tried to switch Nplate to Promacta, but she developed significant generalized pruritus, and we stopped it. She took Promacta for a few days only. She feels well now, and pruritus resolved.  10/08/2024 Reports feeling well at the baseline of her health status, no changes in chronic conditions or new complaints since the last visit.  10/30/2024 Reports feeling well at the baseline of her health status, no changes in chronic conditions or new complaints since the last visit.  11/20/2024 Reports recently fell in the park during her power walk. She has a lot of black and blues that are resolving now. At this time, today, she is back to baseline of her health status.  12/11/2024 Reports feeling well at the baseline of her health status, no changes in chronic conditions or new complaints since the last visit. She is doing well reports gum bleeding I recommended she see a dentist and also very mild epistaxis at times does have dry nose dry mouth has forced air likely from lack of humidity recommended bacitracin into nostrils.  CBC is otherwise stable  1/22/25 She is here for follow up.  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

## 2025-01-22 NOTE — PHYSICAL EXAM
[Ambulatory and capable of all self care but unable to carry out any work activities] : Status 2- Ambulatory and capable of all self care but unable to carry out any work activities. Up and about more than 50% of waking hours [Normal] : affect appropriate [de-identified] : Uses cane for ambulation. [de-identified] : Old and well healed right breast mastectomy scar. [de-identified] : Decreased range of motion in her right shoulder status post right-sided mastectomy.

## 2025-01-22 NOTE — ASSESSMENT
[Palliative] : Goals of care discussed with patient: Palliative [Patient/Caregiver not ready to engage] : Patient/Caregiver not ready to engage [FreeTextEntry1] : # Malignant neoplasm of right breast initially staged IIIB ([id number]) diagnosed in 2014. - Briefly she has been treated with Taxotere, Pertuzumab and Herceptin, Herceptin maintenance, trastuzumab, exemestane, Talbert, Anastrozole, multiple radiation to the supraclavicular and chest wall as well, and now on TDM 1 with demonstrated response unfortunately course has been complicated by thrombocytopenia. She was previously on full dose 3.6 mg /kg but this has been on hold due to thrombocytopenia. She got total of 4 cycles prior transferring her care to us. - Ideally translation of records into English will be helpful. - 08/11/2023 restarted TDM-1 with Ado-trastuzumab emtansine (Kadcyla) at a lower dose level 3 mg/kg given the thrombocytopenia (in past) is on his platelet count is greater than 75,000 - as Cycle 5. - 09/27/2023 Ado-trastuzumab emtansine (Kadcyla) dose decrease to 2.4 mg/kg due to thrombocytopenia. - 11/08/2023 the patient reports right supraclavicular discomfort (similar as to when she progressed the last time). - 11/22/2023 PET - No definite sites of pathologic FDG uptake. - 02/20/2024 PET/CT - [facility]. - 02/2024 ECHO EF 67%. - 06/20/2024 ECHO LV Ejection Fraction EF of 66 %. - 09/12/2024 ECHO LV Ejection Fraction EF of 66 %. - 09/23/2024 CT C/A/P - Since July 11, 2024. No current CT evidence of active intrathoracic disease. Post therapeutic changes from right mastectomy, involving right upper lung field. No evidence of metastatic disease within the abdomen and pelvis.  # Moderate thrombocytopenia from treatment. - had dose reductions. - 12/20/2023 started NPlate 3 mg/kg with every treatment Q3W if PLT<50-60K. - 08/2024 INS denied continuation of Nplate and therefore we decided to switch to Promacta 25 mg PO QD. - 09/18/2024 unable to tolerate Promacta due to significant pruritus and therefore d/c and reinstitute Nplate at 1 mcg/kg.  # Mild LFTs likely form Kadcyla less likely from statin - stable - will monitor. - she has known gallstones as well with colic will see what CT shows.   PLAN: - continue Ado-trastuzumab emtansine (Kadcyla) 2.4 mg/kg Q21D - C26 GIVE TODAY. - continue Nplate at 1 mcg/kg Q3W - GIVE TODAY. Plts stable at 82 - repeat ECHOcardio if symptomatic or every 3-4 months - 12/2024 - ordered. - repeat CT C/A/P for monitoring Q3M  - pending CA 15-3 went up from 40s to 100  but went down to 45.9 CT C/A/P 12/26/24 LYNNE  next CT due 3/2025 or Early April - Labs today: CBC CMP TSH Ca15-3 RTC in 3 weeks with CBC CMP TSH Ca15-3 and same day treatment. [AdvancecareDate] : 12/20/2023

## 2025-02-12 NOTE — HISTORY OF PRESENT ILLNESS
[Disease: _____________________] : Disease: [unfilled] [AJCC Stage: ____] : AJCC Stage: [unfilled] [de-identified] : CC: Here to continue her treatment for right-sided breast cancer.  This is a very pleasant 80-year-old female she has a history of recurrent ER positive, [place] negative, HER2 positive breast cancer.  She was treated as below.  Please note records were in [place] and I did my best with my nurse practitioner to interpret the results but they are aware that English translation as needed.  07/2023 She relocated from [place] to continue her treatment here in the [facility].  The following is a summary of past treatments that she did in [place], [place] and [place]: 2013 she noticed hardness in her right breast, but biopsy revealed only fatty tissue and her mammogram was negative. 10/10/2014 Initially diagnosed with right breast carcinoma stage IIIb ([id number]) with right supraclavicular lymph nodes involvement.  Biopsy revealed:  11/23/2014-04/25/2015 completed 6 courses of pertuzumab, trastuzumab and docetaxel. 04/25/2025-11/10/2015 completed 6 cycles of Herceptin IV. 04/2015 started tamoxifen 1 tab daily.  05/20/2015 underwent right mastectomy with lymph nodes excision. 08/26//2015 radiation treatment to right breast. 11/16/2016 PET - progression in right supraclavicular lymph node. 01/20/2017 PET - progression in right supraclavicular lymph node increased in size. 01/20/2017 completed a course of tamoxifen. 01/2017 noted with progression in right subclavicular lymph node. Her treatment was changed to trastuzumab and anastrozole and she got it from 09/20/2017 to 08/20/2017.  which later was changed to exemestane, and she continued on it till 2019 when she was noted with progression to lymph nodes.  Again, she had biopsy, radiation and changed to a different aromatase inhibitor.  08/14/2020 again progression in the lymph nodes of her right neck.  09/03/2020 lymph node biopsy revealed: poorly differentiated carcinoma. 09/2020 started on trastuzumab and exemestane. 05/2021 she had radiation treatment to right supraclavicular area. 06/2022 and 12/2022 PET revealed positive response to the treatment and decrease FGD uptake in lymph nodes on both sides of her neck, right supraclavicular and mediastinal.  and started Kadcyla 3.6 mg/kg.  She received 5 treatments and had PET scan which showed response to the treatment.  She had 2 additional treatments after that and tolerated it well. Her last treatment was on 06/08/2023 and then postponed due to thrombocytopenia. [de-identified] : ER+Her2+ [place] -0 [de-identified] : 09/01/2023 accompanied by her son; Reports feeling well, no changes in chronic conditions or new complaints since the last visit.  09/06/2023 accompanied by her son; Reports feeling well, but noticed with increasing bruising.  9/27/23 She is here for follow up. She is due for cycle 3 in [facility] but cycle 7 total of Kadcyla. PLT today are 64,000 She has some bruising but no major bleeding.  10/18/2023 accompanied by son; Reports feeling well, but with difficulties to control her BP as she is watching news; no other changes in chronic conditions or new complaints since the last visit.  11/08/2023 accompanied by her daughter-in-law; Reports feeling well, but has discomfort at right neck area "where were previous lymph nodes before the last progression".  11/29/2023 accompanied by her son; Reports feeling well, no changes in chronic conditions or new complaints since the last visit. She had PET/CT on 11/22/23 [facility]. CBC today with stable mild thrombocytopenia. Due for cycle 6 of Kadcyla today.  12/20/2023 accompanied by her son; Reports feeling well, no changes in chronic conditions or new complaints since the last visit.  01/10/2024 accompanied by her son; Reports feeling well now, but had RUQ discomfort for 1 week post last treatment - all resolved now.  01/31/2024 accompanied by her daughter-in-law; Reports feeling well, but with RUQ discomfort for a few days after the last treatment - self-resolved.  2/21/24 She is here for follow-up. She is due for cycle 10 of Kadcyla CBC is stable with . She had a PET.CT on 2/20/24: IMPRESSION: 1. Since November 21, 2023, no new site of pathologic FDG uptake to suggest biologic tumor activity.  4/3/2024 She is here for follow up. She is due for cycle 12 of Kadcyla today CBC is with PLT 74. She feels well. had normal dexa.  04/24/2024 accompanied by her son; Reports feeling well at the baseline of her health status, no changes in chronic conditions or new complaints since the last visit.  6/5/24 She is here for follow up. She has been having some RUQ pain. LFTs stable give or take maybe Gallstones. CBC today stable with PLT 89; No other issues.  06/26/2024 accompanied by her son; Reports feeling well at the baseline of her health status, no changes in chronic conditions or new complaints since the last visit. Recent echo with LVEF 66%.  07/17/2024 accompanied by her daughter-in-law; Reports feeling well at the baseline of her health status, no changes in chronic conditions or new complaints since the last visit. She has whitecoat syndrome and her blood pressure goes up.  08/07/2024 Reports feeling well at the baseline of her health status, no changes in chronic conditions or new complaints since the last visit.  08/28/2024 Reports feeling well at the baseline of her health status, no changes in chronic conditions or new complaints since the last visit. She did not start Promacta yet, but has it ready at home as we decided to see what her PLT level will be today.  09/18/2024 She is here for F/U and treatment. We tried to switch Nplate to Promacta, but she developed significant generalized pruritus, and we stopped it. She took Promacta for a few days only. She feels well now, and pruritus resolved.  10/08/2024 Reports feeling well at the baseline of her health status, no changes in chronic conditions or new complaints since the last visit.  10/30/2024 Reports feeling well at the baseline of her health status, no changes in chronic conditions or new complaints since the last visit.  11/20/2024 Reports recently fell in the park during her power walk. She has a lot of black and blues that are resolving now. At this time, today, she is back to baseline of her health status.  12/11/2024 Reports feeling well at the baseline of her health status, no changes in chronic conditions or new complaints since the last visit. She is doing well reports gum bleeding I recommended she see a dentist and also very mild epistaxis at times does have dry nose dry mouth has forced air likely from lack of humidity recommended bacitracin into nostrils.  CBC is otherwise stable  1/22/25 She is here for follow up.  CBC with stable moderate thrombocytopenia from chemo she feels well no issues  2/12/25 She is here for follow-up CBC today with platelet count of 90  neutrophils are normal CA 15 3 trending down now 33.3.  She feels well

## 2025-02-12 NOTE — PHYSICAL EXAM
[Ambulatory and capable of all self care but unable to carry out any work activities] : Status 2- Ambulatory and capable of all self care but unable to carry out any work activities. Up and about more than 50% of waking hours [Normal] : affect appropriate [de-identified] : Uses cane for ambulation. [de-identified] : Old and well healed right breast mastectomy scar. [de-identified] : Decreased range of motion in her right shoulder status post right-sided mastectomy.

## 2025-02-12 NOTE — ASSESSMENT
[Palliative] : Goals of care discussed with patient: Palliative [Patient/Caregiver not ready to engage] : Patient/Caregiver not ready to engage [FreeTextEntry1] : # Malignant neoplasm of right breast initially staged IIIB ([id number]) diagnosed in 2014. - Briefly she has been treated with Taxotere, Pertuzumab and Herceptin, Herceptin maintenance, trastuzumab, exemestane, Talbert, Anastrozole, multiple radiation to the supraclavicular and chest wall as well, and now on TDM 1 with demonstrated response unfortunately course has been complicated by thrombocytopenia. She was previously on full dose 3.6 mg /kg but this has been on hold due to thrombocytopenia. She got total of 4 cycles prior transferring her care to us. - Ideally translation of records into English will be helpful. - 08/11/2023 restarted TDM-1 with Ado-trastuzumab emtansine (Kadcyla) at a lower dose level 3 mg/kg given the thrombocytopenia (in past) is on his platelet count is greater than 75,000 - as Cycle 5. - 09/27/2023 Ado-trastuzumab emtansine (Kadcyla) dose decrease to 2.4 mg/kg due to thrombocytopenia. - 11/08/2023 the patient reports right supraclavicular discomfort (similar as to when she progressed the last time). - 11/22/2023 PET - No definite sites of pathologic FDG uptake. - 02/20/2024 PET/CT - [facility]. - 02/2024 ECHO EF 67%. - 06/20/2024 ECHO LV Ejection Fraction EF of 66 %. - 09/12/2024 ECHO LV Ejection Fraction EF of 66 %. - 09/23/2024 CT C/A/P - Since July 11, 2024. No current CT evidence of active intrathoracic disease. Post therapeutic changes from right mastectomy, involving right upper lung field. No evidence of metastatic disease within the abdomen and pelvis.  # Moderate thrombocytopenia from treatment. - had dose reductions. - 12/20/2023 started NPlate 3 mg/kg with every treatment Q3W if PLT<50-60K. - 08/2024 INS denied continuation of Nplate and therefore we decided to switch to Promacta 25 mg PO QD. - 09/18/2024 unable to tolerate Promacta due to significant pruritus and therefore d/c and reinstitute Nplate at 1 mcg/kg.  # Mild LFTs likely form Kadcyla less likely from statin - stable - will monitor. - she has known gallstones as well with colic will see what CT shows.   PLAN: - continue Ado-trastuzumab emtansine (Kadcyla) 2.4 mg/kg Q21D - C27 GIVE TODAY. - continue Nplate at 1 mcg/kg Q3W - GIVE TODAY. Plts stable - repeat ECHO cardio if symptomatic I am not sure if there is a benefit of holding treatment in metastatic setting in asymptomatic patients even with mild fall in EF? recent echo normal EF in 1/2025 we can check gain in May - repeat CT C/A/P for monitoring Q4M  -  next CT due April 2025 - Labs today: CBC CMP TSH Ca15-3 RTC in 3 weeks labs in outpatient lab a few days before ordered  [AdvancecareDate] : 12/20/2023

## 2025-03-03 NOTE — REVIEW OF SYSTEMS
[Patient Intake Form Reviewed] : Patient intake form was reviewed [Vision Problems] : vision problems [Heartburn] : heartburn [Joint Pain] : joint pain [Joint Stiffness] : joint stiffness [Muscle Weakness] : muscle weakness [Muscle Pain] : muscle pain [Unsteady Walking] : ataxia [Fever] : no fever [Chills] : no chills [Fatigue] : no fatigue [Recent Change In Weight] : ~T no recent weight change [Pain] : no pain [Redness] : no redness [Itching] : no itching [Sore Throat] : no sore throat [Chest Pain] : no chest pain [Palpitations] : no palpitations [Lower Ext Edema] : no lower extremity edema [Shortness Of Breath] : no shortness of breath [Cough] : no cough [Dyspnea on Exertion] : no dyspnea on exertion [Abdominal Pain] : no abdominal pain [Nausea] : no nausea [Constipation] : no constipation [Diarrhea] : diarrhea [Vomiting] : no vomiting [Melena] : no melena [Dysuria] : no dysuria [Hematuria] : no hematuria [Joint Swelling] : no joint swelling [Back Pain] : no back pain [Itching] : no itching [Skin Rash] : no skin rash [Headache] : no headache [Confusion] : no confusion [Memory Loss] : no memory loss [Easy Bleeding] : no easy bleeding [Easy Bruising] : no easy bruising [Swollen Glands] : no swollen glands [FreeTextEntry3] : worsening vision 2/2 cataracts, followed with ophtho-next appointment in October [FreeTextEntry4] : no headache, no difficulty swallowing

## 2025-03-03 NOTE — PHYSICAL EXAM
[No Acute Distress] : no acute distress [Well-Appearing] : well-appearing [Normal Sclera/Conjunctiva] : normal sclera/conjunctiva [PERRL] : pupils equal round and reactive to light [EOMI] : extraocular movements intact [Normal Outer Ear/Nose] : the outer ears and nose were normal in appearance [Normal Oropharynx] : the oropharynx was normal [No JVD] : no jugular venous distention [No Lymphadenopathy] : no lymphadenopathy [Supple] : supple [Thyroid Normal, No Nodules] : the thyroid was normal and there were no nodules present [No Respiratory Distress] : no respiratory distress  [No Accessory Muscle Use] : no accessory muscle use [Clear to Auscultation] : lungs were clear to auscultation bilaterally [Normal Rate] : normal rate  [Regular Rhythm] : with a regular rhythm [Normal S1, S2] : normal S1 and S2 [No Carotid Bruits] : no carotid bruits [No Abdominal Bruit] : a ~M bruit was not heard ~T in the abdomen [Pedal Pulses Present] : the pedal pulses are present [No Extremity Clubbing/Cyanosis] : no extremity clubbing/cyanosis [Soft] : abdomen soft [Non Tender] : non-tender [Non-distended] : non-distended [Normal Bowel Sounds] : normal bowel sounds [No CVA Tenderness] : no CVA  tenderness [No Spinal Tenderness] : no spinal tenderness [Grossly Normal Strength/Tone] : grossly normal strength/tone [No Rash] : no rash [No Focal Deficits] : no focal deficits [Deep Tendon Reflexes (DTR)] : deep tendon reflexes were 2+ and symmetric [de-identified] : trachea midline [de-identified] : systolic murmur at left sternal border [de-identified] : increased swelling in R>L UE 2/2 lymphatic drainage post breast surgery- chronic [de-identified] : LLE parasthesia/decreased sensation compared to RLE [de-identified] : chronic venous stasis changes in b/l LE [de-identified] : Cranial nerves intact, strength and sensation grossly intact, FROM

## 2025-03-03 NOTE — ASSESSMENT
[FreeTextEntry1] : 81yoF with a PMH of stage 3B breast cancer on chemotherapy (follows with Dr. Marshall), HTN, HLD, chemotherapy-induced thrombocytopenia, h/o CVA in 2005 with no residual defects, hypothyroidism, chronic hip pain 2/2 total R hip replacement presents for routine follow up for health maintenance.  #Stage 3B Breast Cancer on chemotherapy #Chemotherapy-induced thrombocytopenia - follows with Dr. Marshall - On Kadcyla q3wks, currently Cycle 27 - PET scan in 02/2024- stable disease, no new areas of uptake - 09/23/2024 CT C/A/P - Since July 11, 2024. No current CT evidence of active intrathoracic disease. Post therapeutic changes from right mastectomy, involving right upper lung field. No evidence of metastatic disease within the abdomen and pelvis. - CA 15-3 33 2/25 from 45 - next CT 4/25 per hemeonc - on Nplate for thrombocytopenia (did not tolerate promacta), Platelets 90 2/25 stable - DEXA scan wnl - C/w current management  #HTN - BP wnl today - C/w bisoprolol and losartant-HCTZ - TTE 06/2024- EF 66%, mild AS, trace MR, mild TR, mild pHTN  #Hypothyroid - tsh 3.33 2/25 - c/w levothyroxin 50mcg qd  #HLD - LDL 9/24 101 - C/w atorvastatin 20mg qd - F/u fasting lipid profile   #Hypothyroidism - Nonpalpable thyroid - C/w current management - Get TSH on next visit  #Chronic R hip pain #H/o Total R hip replacement - decreased ambulation - Sent prescription for rolling walker - F/u with  regarding transportation form - Follows with Dr. Villanueva  - MRI in 2024- bursitis and gluteus minimus/medius tear  #Blurry Vision, bilateral - Follows with ophthalmology, next appointment October - C/w current management  #Hearing loss - has b/l hearing aids following audiology  #Dry  b/l LE - Ammonium lactate cream prn  #Health Care Maintenance - Routine labs next visit  - F/u lipid profile - Return to care in 6 months

## 2025-03-03 NOTE — HISTORY OF PRESENT ILLNESS
[Spouse] : spouse [Family Member] : family member [FreeTextEntry1] : 6 month follow up  [de-identified] : 83 yo F with a PMH of stage 3B breast cancer on chemotherapy (follows with Dr. Marshall), HTN, HLD, chemotherapy-induced thrombocytopenia, h/o CVA in 2005 with no residual defects ,hypothyroidism, chronic hip pain 2/2 total R hip replacement presents for routine follow up.

## 2025-03-03 NOTE — PHYSICAL EXAM
[No Acute Distress] : no acute distress [Well-Appearing] : well-appearing [Normal Sclera/Conjunctiva] : normal sclera/conjunctiva [PERRL] : pupils equal round and reactive to light [EOMI] : extraocular movements intact [Normal Outer Ear/Nose] : the outer ears and nose were normal in appearance [Normal Oropharynx] : the oropharynx was normal [No JVD] : no jugular venous distention [No Lymphadenopathy] : no lymphadenopathy [Supple] : supple [Thyroid Normal, No Nodules] : the thyroid was normal and there were no nodules present [No Respiratory Distress] : no respiratory distress  [No Accessory Muscle Use] : no accessory muscle use [Clear to Auscultation] : lungs were clear to auscultation bilaterally [Normal Rate] : normal rate  [Regular Rhythm] : with a regular rhythm [Normal S1, S2] : normal S1 and S2 [No Carotid Bruits] : no carotid bruits [No Abdominal Bruit] : a ~M bruit was not heard ~T in the abdomen [Pedal Pulses Present] : the pedal pulses are present [No Extremity Clubbing/Cyanosis] : no extremity clubbing/cyanosis [Soft] : abdomen soft [Non Tender] : non-tender [Non-distended] : non-distended [Normal Bowel Sounds] : normal bowel sounds [No CVA Tenderness] : no CVA  tenderness [No Spinal Tenderness] : no spinal tenderness [Grossly Normal Strength/Tone] : grossly normal strength/tone [No Rash] : no rash [No Focal Deficits] : no focal deficits [Deep Tendon Reflexes (DTR)] : deep tendon reflexes were 2+ and symmetric [de-identified] : trachea midline [de-identified] : systolic murmur at left sternal border [de-identified] : increased swelling in R>L UE 2/2 lymphatic drainage post breast surgery- chronic [de-identified] : LLE parasthesia/decreased sensation compared to RLE [de-identified] : chronic venous stasis changes in b/l LE [de-identified] : Cranial nerves intact, strength and sensation grossly intact, FROM

## 2025-03-03 NOTE — HISTORY OF PRESENT ILLNESS
[Spouse] : spouse [Family Member] : family member [FreeTextEntry1] : 6 month follow up  [de-identified] : 83 yo F with a PMH of stage 3B breast cancer on chemotherapy (follows with Dr. Marshall), HTN, HLD, chemotherapy-induced thrombocytopenia, h/o CVA in 2005 with no residual defects ,hypothyroidism, chronic hip pain 2/2 total R hip replacement presents for routine follow up.

## 2025-03-05 NOTE — ASSESSMENT
[Palliative] : Goals of care discussed with patient: Palliative [Patient/Caregiver not ready to engage] : Patient/Caregiver not ready to engage [FreeTextEntry1] : # Malignant neoplasm of right breast initially staged IIIB ([id number]) diagnosed in 2014. - Briefly she has been treated with Taxotere, Pertuzumab and Herceptin, Herceptin maintenance, trastuzumab, exemestane, Talbert, Anastrozole, multiple radiation to the supraclavicular and chest wall as well, and now on TDM 1 with demonstrated response unfortunately course has been complicated by thrombocytopenia. She was previously on full dose 3.6 mg /kg but this has been on hold due to thrombocytopenia. She got total of 4 cycles prior transferring her care to us. - Ideally translation of records into English will be helpful. - 08/11/2023 restarted TDM-1 with Ado-trastuzumab emtansine (Kadcyla) at a lower dose level 3 mg/kg given the thrombocytopenia (in past) is on his platelet count is greater than 75,000 - as Cycle 5. - 09/27/2023 Ado-trastuzumab emtansine (Kadcyla) dose decrease to 2.4 mg/kg due to thrombocytopenia. - 11/08/2023 the patient reports right supraclavicular discomfort (similar as to when she progressed the last time). - 11/22/2023 PET - No definite sites of pathologic FDG uptake. - 02/20/2024 PET/CT - [facility]. - 02/2024 ECHO EF 67%. - 06/20/2024 ECHO LV Ejection Fraction EF of 66 %. - 09/12/2024 ECHO LV Ejection Fraction EF of 66 %. - 09/23/2024 CT C/A/P - Since July 11, 2024. No current CT evidence of active intrathoracic disease. Post therapeutic changes from right mastectomy, involving right upper lung field. No evidence of metastatic disease within the abdomen and pelvis.  # Moderate thrombocytopenia from treatment. - had dose reductions. - 12/20/2023 started NPlate 3 mg/kg with every treatment Q3W if PLT<50-60K. - 08/2024 INS denied continuation of Nplate and therefore we decided to switch to Promacta 25 mg PO QD. - 09/18/2024 unable to tolerate Promacta due to significant pruritus and therefore d/c and reinstitute Nplate at 1 mcg/kg.  # Mild LFTs likely form Kadcyla less likely from statin - stable - will monitor. - she has known gallstones as well with colic will see what CT shows.   PLAN: - continue Ado-trastuzumab emtansine (Kadcyla) 2.4 mg/kg Q21D - C28 GIVE TODAY. - continue Nplate at 1 mcg/kg Q3W - GIVE TODAY. Plts stable - repeat ECHO cardio if symptomatic I am not sure if there is a benefit of holding treatment in metastatic setting in asymptomatic patients even with mild fall in EF? recent echo normal EF in 1/2025 we can check gain in May - repeat CT C/A/P for monitoring Q4M  -  next CT due April 2025 will order with next visit  - Labs today: CBC CMP  Ca15-3 RTC in 3 weeks labs in outpatient lab a few days before ordered  [AdvancecareDate] : 12/20/2023

## 2025-03-05 NOTE — HISTORY OF PRESENT ILLNESS
[Disease: _____________________] : Disease: [unfilled] [AJCC Stage: ____] : AJCC Stage: [unfilled] [de-identified] : CC: Here to continue her treatment for right-sided breast cancer.  This is a very pleasant 80-year-old female she has a history of recurrent ER positive, RI negative, HER2 positive breast cancer.  She was treated as below.  Please note records were in [place] and I did my best with my nurse practitioner to interpret the results but they are aware that English translation as needed.  07/2023 She relocated from [place] to continue her treatment here in the [facility].  The following is a summary of past treatments that she did in [place], [place] and [place]: 2013 she noticed hardness in her right breast, but biopsy revealed only fatty tissue and her mammogram was negative. 10/10/2014 Initially diagnosed with right breast carcinoma stage IIIb ([id number]) with right supraclavicular lymph nodes involvement.  Biopsy revealed:  11/23/2014-04/25/2015 completed 6 courses of pertuzumab, trastuzumab and docetaxel. 04/25/2025-11/10/2015 completed 6 cycles of Herceptin IV. 04/2015 started tamoxifen 1 tab daily.  05/20/2015 underwent right mastectomy with lymph nodes excision. 08/26//2015 radiation treatment to right breast. 11/16/2016 PET - progression in right supraclavicular lymph node. 01/20/2017 PET - progression in right supraclavicular lymph node increased in size. 01/20/2017 completed a course of tamoxifen. 01/2017 noted with progression in right subclavicular lymph node. Her treatment was changed to trastuzumab and anastrozole and she got it from 09/20/2017 to 08/20/2017.  which later was changed to exemestane, and she continued on it till 2019 when she was noted with progression to lymph nodes.  Again, she had biopsy, radiation and changed to a different aromatase inhibitor.  08/14/2020 again progression in the lymph nodes of her right neck.  09/03/2020 lymph node biopsy revealed: poorly differentiated carcinoma. 09/2020 started on trastuzumab and exemestane. 05/2021 she had radiation treatment to right supraclavicular area. 06/2022 and 12/2022 PET revealed positive response to the treatment and decrease FGD uptake in lymph nodes on both sides of her neck, right supraclavicular and mediastinal.  and started Kadcyla 3.6 mg/kg.  She received 5 treatments and had PET scan which showed response to the treatment.  She had 2 additional treatments after that and tolerated it well. Her last treatment was on 06/08/2023 and then postponed due to thrombocytopenia. [de-identified] : ER+Her2+ OH -0 [de-identified] : 09/01/2023 accompanied by her son; Reports feeling well, no changes in chronic conditions or new complaints since the last visit.  09/06/2023 accompanied by her son; Reports feeling well, but noticed with increasing bruising.  9/27/23 She is here for follow up. She is due for cycle 3 in [facility] but cycle 7 total of Kadcyla. PLT today are 64,000 She has some bruising but no major bleeding.  10/18/2023 accompanied by son; Reports feeling well, but with difficulties to control her BP as she is watching news; no other changes in chronic conditions or new complaints since the last visit.  11/08/2023 accompanied by her daughter-in-law; Reports feeling well, but has discomfort at right neck area "where were previous lymph nodes before the last progression".  11/29/2023 accompanied by her son; Reports feeling well, no changes in chronic conditions or new complaints since the last visit. She had PET/CT on 11/22/23 [facility]. CBC today with stable mild thrombocytopenia. Due for cycle 6 of Kadcyla today.  12/20/2023 accompanied by her son; Reports feeling well, no changes in chronic conditions or new complaints since the last visit.  01/10/2024 accompanied by her son; Reports feeling well now, but had RUQ discomfort for 1 week post last treatment - all resolved now.  01/31/2024 accompanied by her daughter-in-law; Reports feeling well, but with RUQ discomfort for a few days after the last treatment - self-resolved.  2/21/24 She is here for follow-up. She is due for cycle 10 of Kadcyla CBC is stable with . She had a PET.CT on 2/20/24: IMPRESSION: 1. Since November 21, 2023, no new site of pathologic FDG uptake to suggest biologic tumor activity.  4/3/2024 She is here for follow up. She is due for cycle 12 of Kadcyla today CBC is with PLT 74. She feels well. had normal dexa.  04/24/2024 accompanied by her son; Reports feeling well at the baseline of her health status, no changes in chronic conditions or new complaints since the last visit.  6/5/24 She is here for follow up. She has been having some RUQ pain. LFTs stable give or take maybe Gallstones. CBC today stable with PLT 89; No other issues.  06/26/2024 accompanied by her son; Reports feeling well at the baseline of her health status, no changes in chronic conditions or new complaints since the last visit. Recent echo with LVEF 66%.  07/17/2024 accompanied by her daughter-in-law; Reports feeling well at the baseline of her health status, no changes in chronic conditions or new complaints since the last visit. She has whitecoat syndrome and her blood pressure goes up.  08/07/2024 Reports feeling well at the baseline of her health status, no changes in chronic conditions or new complaints since the last visit.  08/28/2024 Reports feeling well at the baseline of her health status, no changes in chronic conditions or new complaints since the last visit. She did not start Promacta yet, but has it ready at home as we decided to see what her PLT level will be today.  09/18/2024 She is here for F/U and treatment. We tried to switch Nplate to Promacta, but she developed significant generalized pruritus, and we stopped it. She took Promacta for a few days only. She feels well now, and pruritus resolved.  10/08/2024 Reports feeling well at the baseline of her health status, no changes in chronic conditions or new complaints since the last visit.  10/30/2024 Reports feeling well at the baseline of her health status, no changes in chronic conditions or new complaints since the last visit.  11/20/2024 Reports recently fell in the park during her power walk. She has a lot of black and blues that are resolving now. At this time, today, she is back to baseline of her health status.  12/11/2024 Reports feeling well at the baseline of her health status, no changes in chronic conditions or new complaints since the last visit. She is doing well reports gum bleeding I recommended she see a dentist and also very mild epistaxis at times does have dry nose dry mouth has forced air likely from lack of humidity recommended bacitracin into nostrils.  CBC is otherwise stable  1/22/25 She is here for follow up.  CBC with stable moderate thrombocytopenia from chemo she feels well no issues  2/12/25 She is here for follow-up CBC today with platelet count of 90  neutrophils are normal CA 15 3 trending down now 33.3.  She feels well  3/5/2025 She is here for follow up. She feels well. She did have a recent cold.  CBC was done today it showed a platelet count of 93,000.  CMP was done by cardiology mild LFTs elevation which has been chronic this was on February 26

## 2025-03-05 NOTE — PHYSICAL EXAM
[Ambulatory and capable of all self care but unable to carry out any work activities] : Status 2- Ambulatory and capable of all self care but unable to carry out any work activities. Up and about more than 50% of waking hours [Normal] : affect appropriate [de-identified] : Uses cane for ambulation. [de-identified] : Old and well healed right breast mastectomy scar. [de-identified] : Decreased range of motion in her right shoulder status post right-sided mastectomy.

## 2025-03-26 NOTE — HISTORY OF PRESENT ILLNESS
[Disease: _____________________] : Disease: [unfilled] [AJCC Stage: ____] : AJCC Stage: [unfilled] [de-identified] : CC: Here to continue her treatment for right-sided breast cancer.  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  She was treated as below.  Please note records were in [place] and I did my best with my nurse practitioner to interpret the results but they are aware that English translation as needed.  07/2023 She relocated from [place] to continue her treatment here in the [facility].  The following is a summary of past treatments that she did in [place], [place]: 2013 she noticed hardness in her right breast, but biopsy revealed only fatty tissue and her mammogram was negative. 10/10/2014 Initially diagnosed with right breast carcinoma stage IIIb ([id number]) with right supraclavicular lymph nodes involvement.  Biopsy revealed:  11/23/2014-04/25/2015 completed 6 courses of pertuzumab, trastuzumab and docetaxel. 04/25/2025-11/10/2015 completed 6 cycles of Herceptin IV. 04/2015 started tamoxifen 1 tab daily.  05/20/2015 underwent right mastectomy with lymph nodes excision. 08/26//2015 radiation treatment to right breast. 11/16/2016 PET - progression in right supraclavicular lymph node. 01/20/2017 PET - progression in right supraclavicular lymph node increased in size. 01/20/2017 completed a course of tamoxifen. 01/2017 noted with progression in right subclavicular lymph node. Her treatment was changed to trastuzumab and anastrozole and she got it from 09/20/2017 to 08/20/2017.  which later was changed to exemestane, and she continued on it till 2019 when she was noted with progression to lymph nodes.  Again, she had biopsy, radiation and changed to a different aromatase inhibitor.  08/14/2020 again progression in the lymph nodes of her right neck.  09/03/2020 lymph node biopsy revealed: poorly differentiated carcinoma. 09/2020 started on trastuzumab and exemestane. 05/2021 she had radiation treatment to right supraclavicular area. 06/2022 and 12/2022 PET revealed positive response to the treatment and decrease FGD uptake in lymph nodes on both sides of her neck, right supraclavicular and mediastinal.  and started Kadcyla 3.6 mg/kg.  She received 5 treatments and had PET scan which showed response to the treatment.  She had 2 additional treatments after that and tolerated it well. Her last treatment was on 06/08/2023 and then postponed due to thrombocytopenia. [de-identified] : ER+Her2+ FL -0 [de-identified] : 09/01/2023 accompanied by her son; Reports feeling well, no changes in chronic conditions or new complaints since the last visit.  09/06/2023 accompanied by her son; Reports feeling well, but noticed with increasing bruising.  9/27/23 She is here for follow up. She is due for cycle 3 in [facility] but cycle 7 total of Kadcyla. PLT today are 64,000 She has some bruising but no major bleeding.  10/18/2023 accompanied by son; Reports feeling well, but with difficulties to control her BP as she is watching news; no other changes in chronic conditions or new complaints since the last visit.  11/08/2023 accompanied by her daughter-in-law; Reports feeling well, but has discomfort at right neck area "where were previous lymph nodes before the last progression".  11/29/2023 accompanied by her son; Reports feeling well, no changes in chronic conditions or new complaints since the last visit. She had PET/CT on 11/22/23 [facility]. CBC today with stable mild thrombocytopenia. Due for cycle 6 of Kadcyla today.  12/20/2023 accompanied by her son; Reports feeling well, no changes in chronic conditions or new complaints since the last visit.  01/10/2024 accompanied by her son; Reports feeling well now, but had RUQ discomfort for 1 week post last treatment - all resolved now.  01/31/2024 accompanied by her daughter-in-law; Reports feeling well, but with RUQ discomfort for a few days after the last treatment - self-resolved.  2/21/24 She is here for follow-up. She is due for cycle 10 of Kadcyla CBC is stable with . She had a PET.CT on 2/20/24: IMPRESSION: 1. Since November 21, 2023, no new site of pathologic FDG uptake to suggest biologic tumor activity.  4/3/2024 She is here for follow up. She is due for cycle 12 of Kadcyla today CBC is with PLT 74. She feels well. had normal dexa.  04/24/2024 accompanied by her son; Reports feeling well at the baseline of her health status, no changes in chronic conditions or new complaints since the last visit.  6/5/24 She is here for follow up. She has been having some RUQ pain. LFTs stable give or take maybe Gallstones. CBC today stable with PLT 89; No other issues.  06/26/2024 accompanied by her son; Reports feeling well at the baseline of her health status, no changes in chronic conditions or new complaints since the last visit. Recent echo with LVEF 66%.  07/17/2024 accompanied by her daughter-in-law; Reports feeling well at the baseline of her health status, no changes in chronic conditions or new complaints since the last visit. She has whitecoat syndrome and her blood pressure goes up.  08/07/2024 Reports feeling well at the baseline of her health status, no changes in chronic conditions or new complaints since the last visit.  08/28/2024 Reports feeling well at the baseline of her health status, no changes in chronic conditions or new complaints since the last visit. She did not start Promacta yet, but has it ready at home as we decided to see what her PLT level will be today.  09/18/2024 She is here for F/U and treatment. We tried to switch Nplate to Promacta, but she developed significant generalized pruritus, and we stopped it. She took Promacta for a few days only. She feels well now, and pruritus resolved.  10/08/2024 Reports feeling well at the baseline of her health status, no changes in chronic conditions or new complaints since the last visit.  10/30/2024 Reports feeling well at the baseline of her health status, no changes in chronic conditions or new complaints since the last visit.  11/20/2024 Reports recently fell in the park during her power walk. She has a lot of black and blues that are resolving now. At this time, today, she is back to baseline of her health status.  12/11/2024 Reports feeling well at the baseline of her health status, no changes in chronic conditions or new complaints since the last visit. She is doing well reports gum bleeding I recommended she see a dentist and also very mild epistaxis at times does have dry nose dry mouth has forced air likely from lack of humidity recommended bacitracin into nostrils.  CBC is otherwise stable  1/22/25 She is here for follow up.  CBC with stable moderate thrombocytopenia from chemo she feels well no issues  2/12/25 She is here for follow-up CBC today with platelet count of 90  neutrophils are normal CA 15 3 trending down now 33.3.  She feels well  3/5/2025 She is here for follow up. She feels well. She did have a recent cold.  CBC was done today it showed a platelet count of 93,000.  CMP was done by cardiology mild LFTs elevation which has been chronic this was on February 26.  03/26/2025 Reports feeling well at the baseline of her health status, no changes in chronic conditions or new complaints since the last visit.

## 2025-03-26 NOTE — PHYSICAL EXAM
[Ambulatory and capable of all self care but unable to carry out any work activities] : Status 2- Ambulatory and capable of all self care but unable to carry out any work activities. Up and about more than 50% of waking hours [Normal] : affect appropriate [de-identified] : Uses cane for ambulation. [de-identified] : Old and well healed right breast mastectomy scar. [de-identified] : Decreased range of motion in her right shoulder status post right-sided mastectomy. She is uses elbow supported high cane for ambulation.

## 2025-03-26 NOTE — ASSESSMENT
[Palliative] : Goals of care discussed with patient: Palliative [Patient/Caregiver not ready to engage] : Patient/Caregiver not ready to engage [FreeTextEntry1] : # Malignant neoplasm of right breast initially staged IIIB ([id number]) diagnosed in 2014. - Briefly she has been treated with Taxotere, Pertuzumab and Herceptin, Herceptin maintenance, trastuzumab, exemestane, Talbert, Anastrozole, multiple radiation to the supraclavicular and chest wall as well, and now on TDM 1 with demonstrated response unfortunately course has been complicated by thrombocytopenia. She was previously on full dose 3.6 mg /kg but this has been on hold due to thrombocytopenia. She got total of 4 cycles prior transferring her care to us. - Ideally translation of records into English will be helpful. - 08/11/2023 restarted TDM-1 with Ado-trastuzumab emtansine (Kadcyla) at a lower dose level 3 mg/kg given the thrombocytopenia (in past) is on his platelet count is greater than 75,000 - as Cycle 5. - 09/27/2023 Ado-trastuzumab emtansine (Kadcyla) dose decrease to 2.4 mg/kg due to thrombocytopenia. - 11/08/2023 the patient reports right supraclavicular discomfort (similar as to when she progressed the last time). - 11/22/2023 PET - No definite sites of pathologic FDG uptake. - 02/20/2024 PET/CT - LYNNE. - 02/2024 ECHO EF 67%. - 06/20/2024 ECHO LV Ejection Fraction EF of 66 %. - 09/12/2024 ECHO LV Ejection Fraction EF of 66 %. - 09/23/2024 CT C/A/P - Since July 11, 2024. No current CT evidence of active intrathoracic disease. Post therapeutic changes from right mastectomy, involving right upper lung field. No evidence of metastatic disease within the abdomen and pelvis. - 12/26/2024 CT C/A/P - Since September 23, 2024 No current evidence of metastatic disease to the chest abdomen or pelvis. - 01/07/2025 ECHO LV Ejection Fraction EF of 67 %.  # Moderate thrombocytopenia from treatment. - had dose reductions. - 12/20/2023 started NPlate 3 mg/kg with every treatment Q3W if PLT<50-60K. - 08/2024 INS denied continuation of Nplate and therefore we decided to switch to Promacta 25 mg PO QD. - 09/18/2024 unable to tolerate Promacta due to significant pruritus and therefore d/c and reinstitute Nplate at 1 mcg/kg.  # Mild LFTs likely form Kadcyla less likely from statin - stable - will monitor. - she has known gallstones as well with colic will see what CT shows.  03/26/2025 Labs reviewed and results discussed with the patient - remains clinically stable to continue current management. All questions were answered to satisfaction.  PLAN: - continue Ado-trastuzumab emtansine (Kadcyla) 2.4 mg/kg Q21D - C29 GIVE TODAY. - continue Nplate 1 mcg/kg Q3W - GIVE TODAY. - repeat ECHO cardio Q3M - 04/2025 - ordered. - repeat CT C/A/P for monitoring Q4M - 04/2025 - ordered.  - Labs today: CBC CMP TSH Ca15-3 RTC in 3 weeks with CBC CMP TSH Ca15-3 and same day treatment. [AdvancecareDate] : 12/20/2023

## 2025-04-17 NOTE — ASSESSMENT
[Palliative] : Goals of care discussed with patient: Palliative [Patient/Caregiver not ready to engage] : Patient/Caregiver not ready to engage [FreeTextEntry1] : # Malignant neoplasm of right breast initially staged IIIB ([id number]) diagnosed in 2014. - Briefly she has been treated with Taxotere, Pertuzumab and Herceptin, Herceptin maintenance, trastuzumab, exemestane, Talbert, Anastrozole, multiple radiation to the supraclavicular and chest wall as well, and now on TDM 1 with demonstrated response unfortunately course has been complicated by thrombocytopenia. She was previously on full dose 3.6 mg /kg but this has been on hold due to thrombocytopenia. She got total of 4 cycles prior transferring her care to us. - Ideally translation of records into English will be helpful. - 08/11/2023 restarted TDM-1 with Ado-trastuzumab emtansine (Kadcyla) at a lower dose level 3 mg/kg given the thrombocytopenia (in past) is on his platelet count is greater than 75,000 - as Cycle 5. - 09/27/2023 Ado-trastuzumab emtansine (Kadcyla) dose decrease to 2.4 mg/kg due to thrombocytopenia. - 11/08/2023 the patient reports right supraclavicular discomfort (similar as to when she progressed the last time). - 11/22/2023 PET - No definite sites of pathologic FDG uptake. - 02/20/2024 PET/CT - LYNNE. - 02/2024 ECHO EF 67%. - 06/20/2024 ECHO LV Ejection Fraction EF of 66 %. - 09/12/2024 ECHO LV Ejection Fraction EF of 66 %. - 09/23/2024 CT C/A/P - Since July 11, 2024. No current CT evidence of active intrathoracic disease. Post therapeutic changes from right mastectomy, involving right upper lung field. No evidence of metastatic disease within the abdomen and pelvis. - 12/26/2024 CT C/A/P - Since September 23, 2024 No current evidence of metastatic disease to the chest abdomen or pelvis. - 01/07/2025 ECHO LV Ejection Fraction EF of 67 %.  # Moderate thrombocytopenia from treatment. - had dose reductions. - 12/20/2023 started NPlate 3 mg/kg with every treatment Q3W if PLT<50-60K. - 08/2024 INS denied continuation of Nplate and therefore we decided to switch to Promacta 25 mg PO QD. - 09/18/2024 unable to tolerate Promacta due to significant pruritus and therefore d/c and reinstitute Nplate at 1 mcg/kg.  # Mild LFTs likely form Kadcyla less likely from statin - stable - will monitor. - she has known gallstones as well with colic will see what CT shows.  04/16/2025 Labs reviewed and results discussed with the patient - remains clinically stable to continue current management. All questions were answered to satisfaction.  PLAN: - continue Ado-trastuzumab emtansine (Kadcyla) 2.4 mg/kg Q21D - C30 GIVE TODAY. - continue Nplate 1 mcg/kg Q3W - GIVE TODAY. - repeat ECHO cardio Q3M - 04/18/2025 - ordered. - repeat CT C/A/P for monitoring Q4M - 04/29/2025 - ordered. - Mastectomy bras #2 and R breast prostheses #1 - ordered. - Labs today: CBC CMP TSH Ca15-3 RTC in 3 weeks with CBC CMP TSH Ca15-3 and same day treatment. [AdvancecareDate] : 12/20/2023

## 2025-04-17 NOTE — END OF VISIT
[FreeTextEntry3] : I was physically present for the key portions of the evaluation and management service provided.  I agree with the history and physical, and plan which I have reviewed and edited where appropriate.  She is here for follow-up she is doing well remains on Kadcyla cycle 30 and Nplate support repeat echo pending repeat imaging is pending blood work done today she will see us back in 3 weeks.  Platelet count is stable at 105,000

## 2025-04-17 NOTE — ASSESSMENT
[Palliative] : Goals of care discussed with patient: Palliative [Patient/Caregiver not ready to engage] : Patient/Caregiver not ready to engage [FreeTextEntry1] : # Malignant neoplasm of right breast initially staged IIIB ([id number]) diagnosed in 2014. - Briefly she has been treated with Taxotere, Pertuzumab and Herceptin, Herceptin maintenance, trastuzumab, exemestane, Talbert, Anastrozole, multiple radiation to the supraclavicular and chest wall as well, and now on TDM 1 with demonstrated response unfortunately course has been complicated by thrombocytopenia. She was previously on full dose 3.6 mg /kg but this has been on hold due to thrombocytopenia. She got total of 4 cycles prior transferring her care to us. - Ideally translation of records into English will be helpful. - 08/11/2023 restarted TDM-1 with Ado-trastuzumab emtansine (Kadcyla) at a lower dose level 3 mg/kg given the thrombocytopenia (in past) is on his platelet count is greater than 75,000 - as Cycle 5. - 09/27/2023 Ado-trastuzumab emtansine (Kadcyla) dose decrease to 2.4 mg/kg due to thrombocytopenia. - 11/08/2023 the patient reports right supraclavicular discomfort (similar as to when she progressed the last time). - 11/22/2023 PET - No definite sites of pathologic FDG uptake. - 02/20/2024 PET/CT - [facility]YNNE. - 02/2024 ECHO EF 67%. - 06/20/2024 ECHO LV Ejection Fraction EF of 66 %. - 09/12/2024 ECHO LV Ejection Fraction EF of 66 %. - 09/23/2024 CT C/A/P - Since July 11, 2024. No current CT evidence of active intrathoracic disease. Post therapeutic changes from right mastectomy, involving right upper lung field. No evidence of metastatic disease within the abdomen and pelvis. - 12/26/2024 CT C/A/P - Since September 23, 2024 No current evidence of metastatic disease to the chest abdomen or pelvis. - 01/07/2025 ECHO LV Ejection Fraction EF of 67 %.  # Moderate thrombocytopenia from treatment. - had dose reductions. - 12/20/2023 started NPlate 3 mg/kg with every treatment Q3W if PLT<50-60K. - 08/2024 INS denied continuation of Nplate and therefore we decided to switch to Promacta 25 mg PO QD. - 09/18/2024 unable to tolerate Promacta due to significant pruritus and therefore d/c and reinstitute Nplate at 1 mcg/kg.  # Mild LFTs likely form Kadcyla less likely from statin - stable - will monitor. - she has known gallstones as well with colic will see what CT shows.  04/16/2025 Labs reviewed and results discussed with the patient - remains clinically stable to continue current management. All questions were answered to satisfaction.  PLAN: - continue Ado-trastuzumab emtansine (Kadcyla) 2.4 mg/kg Q21D - C30 GIVE TODAY. - continue Nplate 1 mcg/kg Q3W - GIVE TODAY. - repeat ECHO cardio Q3M - 04/18/2025 - ordered. - repeat CT C/A/P for monitoring Q4M - 04/29/2025 - ordered. - Mastectomy bras #2 and R breast prostheses #1 - ordered. - Labs today: CBC CMP TSH Ca15-3 RTC in 3 weeks with CBC CMP TSH Ca15-3 and same day treatment. [AdvancecareDate] : 12/20/2023

## 2025-04-17 NOTE — HISTORY OF PRESENT ILLNESS
[Disease: _____________________] : Disease: [unfilled] [AJCC Stage: ____] : AJCC Stage: [unfilled] [de-identified] : CC: Here to continue her treatment for right-sided breast cancer.  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  She was treated as below.  Please note records were in [place] and I did my best with my nurse practitioner to interpret the results but they are aware that English translation as needed.  07/2023 She relocated from [place] to continue her treatment here in the [facility].  The following is a summary of past treatments that she did in [place], [place] and [place]: 2013 she noticed hardness in her right breast, but biopsy revealed only fatty tissue and her mammogram was negative. 10/10/2014 Initially diagnosed with right breast carcinoma stage IIIb ([id number]) with right supraclavicular lymph nodes involvement.  Biopsy revealed:  11/23/2014-04/25/2015 completed 6 courses of pertuzumab, trastuzumab and docetaxel. 04/25/2025-11/10/2015 completed 6 cycles of Herceptin IV. 04/2015 started tamoxifen 1 tab daily.  05/20/2015 underwent right mastectomy with lymph nodes excision. 08/26//2015 radiation treatment to right breast. 11/16/2016 PET - progression in right supraclavicular lymph node. 01/20/2017 PET - progression in right supraclavicular lymph node increased in size. 01/20/2017 completed a course of tamoxifen. 01/2017 noted with progression in right subclavicular lymph node. Her treatment was changed to trastuzumab and anastrozole and she got it from 09/20/2017 to 08/20/2017.  which later was changed to exemestane, and she continued on it till 2019 when she was noted with progression to lymph nodes.  Again, she had biopsy, radiation and changed to a different aromatase inhibitor.  08/14/2020 again progression in the lymph nodes of her right neck.  09/03/2020 lymph node biopsy revealed: poorly differentiated carcinoma. 09/2020 started on trastuzumab and exemestane. 05/2021 she had radiation treatment to right supraclavicular area. 06/2022 and 12/2022 PET revealed positive response to the treatment and decrease FGD uptake in lymph nodes on both sides of her neck, right supraclavicular and mediastinal.  and started Kadcyla 3.6 mg/kg.  She received 5 treatments and had PET scan which showed response to the treatment.  She had 2 additional treatments after that and tolerated it well. Her last treatment was on 06/08/2023 and then postponed due to thrombocytopenia. [de-identified] : ER+Her2+ DE -0 [de-identified] : 09/01/2023 accompanied by her son; Reports feeling well, no changes in chronic conditions or new complaints since the last visit.  09/06/2023 accompanied by her son; Reports feeling well, but noticed with increasing bruising.  9/27/23 She is here for follow up. She is due for cycle 3 in [facility] but cycle 7 total of Kadcyla. PLT today are 64,000 She has some bruising but no major bleeding.  10/18/2023 accompanied by son; Reports feeling well, but with difficulties to control her BP as she is watching news; no other changes in chronic conditions or new complaints since the last visit.  11/08/2023 accompanied by her daughter-in-law; Reports feeling well, but has discomfort at right neck area "where were previous lymph nodes before the last progression".  11/29/2023 accompanied by her son; Reports feeling well, no changes in chronic conditions or new complaints since the last visit. She had PET/CT on 11/22/23 LYNNE. CBC today with stable mild thrombocytopenia. Due for cycle 6 of Kadcyla today.  12/20/2023 accompanied by her son; Reports feeling well, no changes in chronic conditions or new complaints since the last visit.  01/10/2024 accompanied by her son; Reports feeling well now, but had RUQ discomfort for 1 week post last treatment - all resolved now.  01/31/2024 accompanied by her daughter-in-law; Reports feeling well, but with RUQ discomfort for a few days after the last treatment - self-resolved.  2/21/24 She is here for follow-up. She is due for cycle 10 of Kadcyla CBC is stable with . She had a PET.CT on 2/20/24: IMPRESSION: 1. Since November 21, 2023, no new site of pathologic FDG uptake to suggest biologic tumor activity.  4/3/2024 She is here for follow up. She is due for cycle 12 of Kadcyla today CBC is with PLT 74. She feels well. had normal dexa.  04/24/2024 accompanied by her son; Reports feeling well at the baseline of her health status, no changes in chronic conditions or new complaints since the last visit.  6/5/24 She is here for follow up. She has been having some RUQ pain. LFTs stable give or take maybe Gallstones. CBC today stable with PLT 89; No other issues.  06/26/2024 accompanied by her son; Reports feeling well at the baseline of her health status, no changes in chronic conditions or new complaints since the last visit. Recent echo with LVEF 66%.  07/17/2024 accompanied by her daughter-in-law; Reports feeling well at the baseline of her health status, no changes in chronic conditions or new complaints since the last visit. She has whitecoat syndrome and her blood pressure goes up.  08/07/2024 Reports feeling well at the baseline of her health status, no changes in chronic conditions or new complaints since the last visit.  08/28/2024 Reports feeling well at the baseline of her health status, no changes in chronic conditions or new complaints since the last visit. She did not start Promacta yet, but has it ready at home as we decided to see what her PLT level will be today.  09/18/2024 She is here for F/U and treatment. We tried to switch Nplate to Promacta, but she developed significant generalized pruritus, and we stopped it. She took Promacta for a few days only. She feels well now, and pruritus resolved.  10/08/2024 Reports feeling well at the baseline of her health status, no changes in chronic conditions or new complaints since the last visit.  10/30/2024 Reports feeling well at the baseline of her health status, no changes in chronic conditions or new complaints since the last visit.  11/20/2024 Reports recently fell in the park during her power walk. She has a lot of black and blues that are resolving now. At this time, today, she is back to baseline of her health status.  12/11/2024 Reports feeling well at the baseline of her health status, no changes in chronic conditions or new complaints since the last visit. She is doing well reports gum bleeding I recommended she see a dentist and also very mild epistaxis at times does have dry nose dry mouth has forced air likely from lack of humidity recommended bacitracin into nostrils.  CBC is otherwise stable  1/22/25 She is here for follow up.  CBC with stable moderate thrombocytopenia from chemo she feels well no issues  2/12/25 She is here for follow-up CBC today with platelet count of 90  neutrophils are normal CA 15 3 trending down now 33.3.  She feels well  3/5/2025 She is here for follow up. She feels well. She did have a recent cold.  CBC was done today it showed a platelet count of 93,000.  CMP was done by cardiology mild LFTs elevation which has been chronic this was on February 26.  03/26/2025 Reports feeling well at the baseline of her health status, no changes in chronic conditions or new complaints since the last visit.  04/16/2025 Reports feeling well at the baseline of her health status, no changes in chronic conditions or new complaints since the last visit.

## 2025-04-17 NOTE — PHYSICAL EXAM
[Ambulatory and capable of all self care but unable to carry out any work activities] : Status 2- Ambulatory and capable of all self care but unable to carry out any work activities. Up and about more than 50% of waking hours [Normal] : affect appropriate [de-identified] : Uses cane for ambulation. [de-identified] : Old and well healed right breast mastectomy scar. [de-identified] : Decreased range of motion in her right shoulder status post right-sided mastectomy. She is uses elbow supported high cane for ambulation.

## 2025-04-17 NOTE — PHYSICAL EXAM
[Ambulatory and capable of all self care but unable to carry out any work activities] : Status 2- Ambulatory and capable of all self care but unable to carry out any work activities. Up and about more than 50% of waking hours [Normal] : affect appropriate [de-identified] : Uses cane for ambulation. [de-identified] : Old and well healed right breast mastectomy scar. [de-identified] : Decreased range of motion in her right shoulder status post right-sided mastectomy. She is uses elbow supported high cane for ambulation.

## 2025-04-17 NOTE — HISTORY OF PRESENT ILLNESS
[Disease: _____________________] : Disease: [unfilled] [AJCC Stage: ____] : AJCC Stage: [unfilled] [de-identified] : CC: Here to continue her treatment for right-sided breast cancer.  This is a very pleasant 80-year-old female she has a history of recurrent ER positive, MT negative, HER2 positive breast cancer.  She was treated as below.  Please note records were in [place] and I did my best with my nurse practitioner to interpret the results but they are aware that English translation as needed.  07/2023 She relocated from [place] to continue her treatment here in the [facility].  The following is a summary of past treatments that she did in [place], [place] and [place]: 2013 she noticed hardness in her right breast, but biopsy revealed only fatty tissue and her mammogram was negative. 10/10/2014 Initially diagnosed with right breast carcinoma stage IIIb ([id number]) with right supraclavicular lymph nodes involvement.  Biopsy revealed:  11/23/2014-04/25/2015 completed 6 courses of pertuzumab, trastuzumab and docetaxel. 04/25/2025-11/10/2015 completed 6 cycles of Herceptin IV. 04/2015 started tamoxifen 1 tab daily.  05/20/2015 underwent right mastectomy with lymph nodes excision. 08/26//2015 radiation treatment to right breast. 11/16/2016 PET - progression in right supraclavicular lymph node. 01/20/2017 PET - progression in right supraclavicular lymph node increased in size. 01/20/2017 completed a course of tamoxifen. 01/2017 noted with progression in right subclavicular lymph node. Her treatment was changed to trastuzumab and anastrozole and she got it from 09/20/2017 to 08/20/2017.  which later was changed to exemestane, and she continued on it till 2019 when she was noted with progression to lymph nodes.  Again, she had biopsy, radiation and changed to a different aromatase inhibitor.  08/14/2020 again progression in the lymph nodes of her right neck.  09/03/2020 lymph node biopsy revealed: poorly differentiated carcinoma. 09/2020 started on trastuzumab and exemestane. 05/2021 she had radiation treatment to right supraclavicular area. 06/2022 and 12/2022 PET revealed positive response to the treatment and decrease FGD uptake in lymph nodes on both sides of her neck, right supraclavicular and mediastinal.  and started Kadcyla 3.6 mg/kg.  She received 5 treatments and had PET scan which showed response to the treatment.  She had 2 additional treatments after that and tolerated it well. Her last treatment was on 06/08/2023 and then postponed due to thrombocytopenia. [de-identified] : ER+Her2+ SD -0 [de-identified] : 09/01/2023 accompanied by her son; Reports feeling well, no changes in chronic conditions or new complaints since the last visit.  09/06/2023 accompanied by her son; Reports feeling well, but noticed with increasing bruising.  9/27/23 She is here for follow up. She is due for cycle 3 in [facility] but cycle 7 total of Kadcyla. PLT today are 64,000 She has some bruising but no major bleeding.  10/18/2023 accompanied by son; Reports feeling well, but with difficulties to control her BP as she is watching news; no other changes in chronic conditions or new complaints since the last visit.  11/08/2023 accompanied by her daughter-in-law; Reports feeling well, but has discomfort at right neck area "where were previous lymph nodes before the last progression".  11/29/2023 accompanied by her son; Reports feeling well, no changes in chronic conditions or new complaints since the last visit. She had PET/CT on 11/22/23 LYNNE. CBC today with stable mild thrombocytopenia. Due for cycle 6 of Kadcyla today.  12/20/2023 accompanied by her son; Reports feeling well, no changes in chronic conditions or new complaints since the last visit.  01/10/2024 accompanied by her son; Reports feeling well now, but had RUQ discomfort for 1 week post last treatment - all resolved now.  01/31/2024 accompanied by her daughter-in-law; Reports feeling well, but with RUQ discomfort for a few days after the last treatment - self-resolved.  2/21/24 She is here for follow-up. She is due for cycle 10 of Kadcyla CBC is stable with . She had a PET.CT on 2/20/24: IMPRESSION: 1. Since November 21, 2023, no new site of pathologic FDG uptake to suggest biologic tumor activity.  4/3/2024 She is here for follow up. She is due for cycle 12 of Kadcyla today CBC is with PLT 74. She feels well. had normal dexa.  04/24/2024 accompanied by her son; Reports feeling well at the baseline of her health status, no changes in chronic conditions or new complaints since the last visit.  6/5/24 She is here for follow up. She has been having some RUQ pain. LFTs stable give or take maybe Gallstones. CBC today stable with PLT 89; No other issues.  06/26/2024 accompanied by her son; Reports feeling well at the baseline of her health status, no changes in chronic conditions or new complaints since the last visit. Recent echo with LVEF 66%.  07/17/2024 accompanied by her daughter-in-law; Reports feeling well at the baseline of her health status, no changes in chronic conditions or new complaints since the last visit. She has whitecoat syndrome and her blood pressure goes up.  08/07/2024 Reports feeling well at the baseline of her health status, no changes in chronic conditions or new complaints since the last visit.  08/28/2024 Reports feeling well at the baseline of her health status, no changes in chronic conditions or new complaints since the last visit. She did not start Promacta yet, but has it ready at home as we decided to see what her PLT level will be today.  09/18/2024 She is here for F/U and treatment. We tried to switch Nplate to Promacta, but she developed significant generalized pruritus, and we stopped it. She took Promacta for a few days only. She feels well now, and pruritus resolved.  10/08/2024 Reports feeling well at the baseline of her health status, no changes in chronic conditions or new complaints since the last visit.  10/30/2024 Reports feeling well at the baseline of her health status, no changes in chronic conditions or new complaints since the last visit.  11/20/2024 Reports recently fell in the park during her power walk. She has a lot of black and blues that are resolving now. At this time, today, she is back to baseline of her health status.  12/11/2024 Reports feeling well at the baseline of her health status, no changes in chronic conditions or new complaints since the last visit. She is doing well reports gum bleeding I recommended she see a dentist and also very mild epistaxis at times does have dry nose dry mouth has forced air likely from lack of humidity recommended bacitracin into nostrils.  CBC is otherwise stable  1/22/25 She is here for follow up.  CBC with stable moderate thrombocytopenia from chemo she feels well no issues  2/12/25 She is here for follow-up CBC today with platelet count of 90  neutrophils are normal CA 15 3 trending down now 33.3.  She feels well  3/5/2025 She is here for follow up. She feels well. She did have a recent cold.  CBC was done today it showed a platelet count of 93,000.  CMP was done by cardiology mild LFTs elevation which has been chronic this was on February 26.  03/26/2025 Reports feeling well at the baseline of her health status, no changes in chronic conditions or new complaints since the last visit.  04/16/2025 Reports feeling well at the baseline of her health status, no changes in chronic conditions or new complaints since the last visit.

## 2025-05-08 NOTE — END OF VISIT
[FreeTextEntry3] : I was physically present for the key portions of the evaluation and management service provided.  I agree with the history and physical, and plan which I have reviewed and edited where appropriate.  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

## 2025-05-08 NOTE — ASSESSMENT
[Palliative] : Goals of care discussed with patient: Palliative [Patient/Caregiver not ready to engage] : Patient/Caregiver not ready to engage [FreeTextEntry1] : # Malignant neoplasm of right breast initially staged IIIB ([id number]) diagnosed in 2014. - Briefly she has been treated with Taxotere, Pertuzumab and Herceptin, Herceptin maintenance, trastuzumab, exemestane, Talbert, Anastrozole, multiple radiation to the supraclavicular and chest wall as well, and now on TDM 1 with demonstrated response unfortunately course has been complicated by thrombocytopenia. She was previously on full dose 3.6 mg /kg but this has been on hold due to thrombocytopenia. She got total of 4 cycles prior transferring her care to us. - Ideally translation of records into English will be helpful. - 08/11/2023 restarted TDM-1 with Ado-trastuzumab emtansine (Kadcyla) at a lower dose level 3 mg/kg given the thrombocytopenia (in past) is on his platelet count is greater than 75,000 - as Cycle 5. - 09/27/2023 Ado-trastuzumab emtansine (Kadcyla) dose decrease to 2.4 mg/kg due to thrombocytopenia. - 11/08/2023 the patient reports right supraclavicular discomfort (similar as to when she progressed the last time). - 11/22/2023 PET - No definite sites of pathologic FDG uptake. - 02/20/2024 PET/CT - LYNNE. - 02/2024 ECHO EF 67%. - 06/20/2024 ECHO LV Ejection Fraction EF of 66 %. - 09/12/2024 ECHO LV Ejection Fraction EF of 66 %. - 09/23/2024 CT C/A/P - Since July 11, 2024. No current CT evidence of active intrathoracic disease. Post therapeutic changes from right mastectomy, involving right upper lung field. No evidence of metastatic disease within the abdomen and pelvis. - 12/26/2024 CT C/A/P - Since September 23, 2024 No current evidence of metastatic disease to the chest abdomen or pelvis. - 01/07/2025 ECHO LV Ejection Fraction EF of 67 %. - 04/29/20025 CT C/A/P - Status post right mastectomy with post radiation changes within the mid and upper lung anterior portions of the right lung. No suspicious pulmonary nodules. Since CT abdomen pelvis performed on March 26, 2024; No CT evidence of recurrent/metastatic disease within the abdomen or pelvis.  # Moderate thrombocytopenia from treatment. - had dose reductions. - 12/20/2023 started NPlate 3 mg/kg with every treatment Q3W if PLT<50-60K. - 08/2024 INS denied continuation of Nplate and therefore we decided to switch to Promacta 25 mg PO QD. - 09/18/2024 unable to tolerate Promacta due to significant pruritus and therefore d/c and reinstitute Nplate at 1 mcg/kg.  # Mild LFTs likely form Kadcyla less likely from statin - stable - will monitor. - she has known gallstones as well with colic will see what CT shows.  05/07/2025 Labs reviewed and results discussed with the patient - remains clinically stable to continue current management. All questions were answered to satisfaction.  PLAN: - continue Ado-trastuzumab emtansine (Kadcyla) 2.4 mg/kg Q21D - C31 GIVE TODAY. - continue Nplate 1 mcg/kg Q3W - GIVE TODAY. - repeat ECHO cardio Q3M - 04/18/2025 - ordered. - repeat CT C/A/P for monitoring Q4M - 08/2025. - Mastectomy bras #2 and R breast prostheses #1 - 04/2026. - Labs today: CBC CMP TSH Ca15-3 RTC in 3 weeks with CBC CMP TSH Ca15-3 and same day treatment. [AdvancecareDate] : 12/20/2023

## 2025-05-08 NOTE — HISTORY OF PRESENT ILLNESS
[Disease: _____________________] : Disease: [unfilled] [AJCC Stage: ____] : AJCC Stage: [unfilled] [de-identified] : CC: Here to continue her treatment for right-sided breast cancer.  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  She was treated as below.  Please note records were in [place] and I did my best with my nurse practitioner to interpret the results but they are aware that English translation as needed.  07/2023 She relocated from [place] to continue her treatment here in the [facility].  The following is a summary of past treatments that she did in [place], [place] and [place]: 2013 she noticed hardness in her right breast, but biopsy revealed only fatty tissue and her mammogram was negative. 10/10/2014 Initially diagnosed with right breast carcinoma stage IIIb ([id number]) with right supraclavicular lymph nodes involvement.  Biopsy revealed:  11/23/2014-04/25/2015 completed 6 courses of pertuzumab, trastuzumab and docetaxel. 04/25/2025-11/10/2015 completed 6 cycles of Herceptin IV. 04/2015 started tamoxifen 1 tab daily.  05/20/2015 underwent right mastectomy with lymph nodes excision. 08/26//2015 radiation treatment to right breast. 11/16/2016 PET - progression in right supraclavicular lymph node. 01/20/2017 PET - progression in right supraclavicular lymph node increased in size. 01/20/2017 completed a course of tamoxifen. 01/2017 noted with progression in right subclavicular lymph node. Her treatment was changed to trastuzumab and anastrozole and she got it from 09/20/2017 to 08/20/2017.  which later was changed to exemestane, and she continued on it till 2019 when she was noted with progression to lymph nodes.  Again, she had biopsy, radiation and changed to a different aromatase inhibitor.  08/14/2020 again progression in the lymph nodes of her right neck.  09/03/2020 lymph node biopsy revealed: poorly differentiated carcinoma. 09/2020 started on trastuzumab and exemestane. 05/2021 she had radiation treatment to right supraclavicular area. 06/2022 and 12/2022 PET revealed positive response to the treatment and decrease FGD uptake in lymph nodes on both sides of her neck, right supraclavicular and mediastinal.  and started Kadcyla 3.6 mg/kg.  She received 5 treatments and had PET scan which showed response to the treatment.  She had 2 additional treatments after that and tolerated it well. Her last treatment was on 06/08/2023 and then postponed due to thrombocytopenia. [de-identified] : ER+Her2+ [place] -0 [de-identified] : 09/01/2023 accompanied by her son; Reports feeling well, no changes in chronic conditions or new complaints since the last visit.  09/06/2023 accompanied by her son; Reports feeling well, but noticed with increasing bruising.  9/27/23 She is here for follow up. She is due for cycle 3 in [facility] but cycle 7 total of Kadcyla. PLT today are 64,000 She has some bruising but no major bleeding.  10/18/2023 accompanied by son; Reports feeling well, but with difficulties to control her BP as she is watching news; no other changes in chronic conditions or new complaints since the last visit.  11/08/2023 accompanied by her daughter-in-law; Reports feeling well, but has discomfort at right neck area "where were previous lymph nodes before the last progression".  11/29/2023 accompanied by her son; Reports feeling well, no changes in chronic conditions or new complaints since the last visit. She had PET/CT on 11/22/23 [facility]. CBC today with stable mild thrombocytopenia. Due for cycle 6 of Kadcyla today.  12/20/2023 accompanied by her son; Reports feeling well, no changes in chronic conditions or new complaints since the last visit.  01/10/2024 accompanied by her son; Reports feeling well now, but had RUQ discomfort for 1 week post last treatment - all resolved now.  01/31/2024 accompanied by her daughter-in-law; Reports feeling well, but with RUQ discomfort for a few days after the last treatment - self-resolved.  2/21/24 She is here for follow-up. She is due for cycle 10 of Kadcyla CBC is stable with . She had a PET.CT on 2/20/24: IMPRESSION: 1. Since November 21, 2023, no new site of pathologic FDG uptake to suggest biologic tumor activity.  4/3/2024 She is here for follow up. She is due for cycle 12 of Kadcyla today CBC is with PLT 74. She feels well. had normal dexa.  04/24/2024 accompanied by her son; Reports feeling well at the baseline of her health status, no changes in chronic conditions or new complaints since the last visit.  6/5/24 She is here for follow up. She has been having some RUQ pain. LFTs stable give or take maybe Gallstones. CBC today stable with PLT 89; No other issues.  06/26/2024 accompanied by her son; Reports feeling well at the baseline of her health status, no changes in chronic conditions or new complaints since the last visit. Recent echo with LVEF 66%.  07/17/2024 accompanied by her daughter-in-law; Reports feeling well at the baseline of her health status, no changes in chronic conditions or new complaints since the last visit. She has whitecoat syndrome and her blood pressure goes up.  08/07/2024 Reports feeling well at the baseline of her health status, no changes in chronic conditions or new complaints since the last visit.  08/28/2024 Reports feeling well at the baseline of her health status, no changes in chronic conditions or new complaints since the last visit. She did not start Promacta yet, but has it ready at home as we decided to see what her PLT level will be today.  09/18/2024 She is here for F/U and treatment. We tried to switch Nplate to Promacta, but she developed significant generalized pruritus, and we stopped it. She took Promacta for a few days only. She feels well now, and pruritus resolved.  10/08/2024 Reports feeling well at the baseline of her health status, no changes in chronic conditions or new complaints since the last visit.  10/30/2024 Reports feeling well at the baseline of her health status, no changes in chronic conditions or new complaints since the last visit.  11/20/2024 Reports recently fell in the park during her power walk. She has a lot of black and blues that are resolving now. At this time, today, she is back to baseline of her health status.  12/11/2024 Reports feeling well at the baseline of her health status, no changes in chronic conditions or new complaints since the last visit. She is doing well reports gum bleeding I recommended she see a dentist and also very mild epistaxis at times does have dry nose dry mouth has forced air likely from lack of humidity recommended bacitracin into nostrils.  CBC is otherwise stable  1/22/25 She is here for follow up.  CBC with stable moderate thrombocytopenia from chemo she feels well no issues  2/12/25 She is here for follow-up CBC today with platelet count of 90  neutrophils are normal CA 15 3 trending down now 33.3.  She feels well  3/5/2025 She is here for follow up. She feels well. She did have a recent cold.  CBC was done today it showed a platelet count of 93,000.  CMP was done by cardiology mild LFTs elevation which has been chronic this was on February 26.  03/26/2025 Reports feeling well at the baseline of her health status, no changes in chronic conditions or new complaints since the last visit.  04/16/2025 Reports feeling well at the baseline of her health status, no changes in chronic conditions or new complaints since the last visit.  05/07/2025 Reports feeling well at the baseline of her health status, no changes in chronic conditions or new complaints since the last visit.

## 2025-05-08 NOTE — PHYSICAL EXAM
[Ambulatory and capable of all self care but unable to carry out any work activities] : Status 2- Ambulatory and capable of all self care but unable to carry out any work activities. Up and about more than 50% of waking hours [Normal] : affect appropriate [de-identified] : Uses cane for ambulation. [de-identified] : Old and well healed right breast mastectomy scar. [de-identified] : Decreased range of motion in her right shoulder status post right-sided mastectomy. She is uses elbow supported high cane for ambulation.

## 2025-06-06 NOTE — ASSESSMENT
[Palliative] : Goals of care discussed with patient: Palliative [Patient/Caregiver not ready to engage] : Patient/Caregiver not ready to engage [FreeTextEntry1] : # Malignant neoplasm of right breast initially staged IIIB ([id number]) diagnosed in 2014. - Briefly she has been treated with Taxotere, Pertuzumab and Herceptin, Herceptin maintenance, trastuzumab, exemestane, Talbert, Anastrozole, multiple radiation to the supraclavicular and chest wall as well, and now on TDM 1 with demonstrated response unfortunately course has been complicated by thrombocytopenia. She was previously on full dose 3.6 mg /kg but this has been on hold due to thrombocytopenia. She got total of 4 cycles prior transferring her care to us. - Ideally translation of records into English will be helpful. - 08/11/2023 restarted TDM-1 with Ado-trastuzumab emtansine (Kadcyla) at a lower dose level 3 mg/kg given the thrombocytopenia (in past) is on his platelet count is greater than 75,000 - as Cycle 5. - 09/27/2023 Ado-trastuzumab emtansine (Kadcyla) dose decrease to 2.4 mg/kg due to thrombocytopenia. - 11/08/2023 the patient reports right supraclavicular discomfort (similar as to when she progressed the last time). - 11/22/2023 PET - No definite sites of pathologic FDG uptake. - 02/20/2024 PET/CT - [facility]. - 02/2024 ECHO EF 67%. - 06/20/2024 ECHO LV Ejection Fraction EF of 66 %. - 09/12/2024 ECHO LV Ejection Fraction EF of 66 %. - 09/23/2024 CT C/A/P - Since July 11, 2024. No current CT evidence of active intrathoracic disease. Post therapeutic changes from right mastectomy, involving right upper lung field. No evidence of metastatic disease within the abdomen and pelvis. - 12/26/2024 CT C/A/P - Since September 23, 2024 No current evidence of metastatic disease to the chest abdomen or pelvis. - 01/07/2025 ECHO LV Ejection Fraction EF of 67 %. - 04/18/2025 ECHO LV Ejection Fraction EF of 69 %. - 04/29/20025 CT C/A/P - Status post right mastectomy with post radiation changes within the mid and upper lung anterior portions of the right lung. No suspicious pulmonary nodules. Since CT abdomen pelvis performed on March 26, 2024; No CT evidence of recurrent/metastatic disease within the abdomen or pelvis.  # Moderate thrombocytopenia from treatment. - had dose reductions. - 12/20/2023 started NPlate 3 mg/kg with every treatment Q3W if PLT<50-60K. - 08/2024 INS denied continuation of Nplate and therefore we decided to switch to Promacta 25 mg PO QD. - 09/18/2024 unable to tolerate Promacta due to significant pruritus and therefore d/c and reinstitute Nplate at 1 mcg/kg.  # Mild LFTs likely form Kadcyla less likely from statin - stable - will monitor. - she has known gallstones as well with colic will see what CT shows.  06/04/2025 Labs reviewed and results discussed with the patient; no pain reported - remains clinically stable to continue current management. All questions were answered to satisfaction.  PLAN: - continue Ado-trastuzumab emtansine (Kadcyla) 2.4 mg/kg Q21D - C32 GIVE TODAY. - continue Nplate 1 mcg/kg Q3W - GIVE TODAY. - repeat ECHO cardio Q3M - 07/2025. - repeat CT C/A/P for monitoring Q4M - 08/2025. - Mastectomy bras #2 and R breast prostheses #1 - 04/2026. - Labs today: CBC CMP TSH Ca15-3 RTC in 3 weeks with CBC CMP TSH Ca15-3 and same day treatment. [AdvancecareDate] : 12/20/2023 Speaking Coherently

## 2025-06-06 NOTE — PHYSICAL EXAM
[Ambulatory and capable of all self care but unable to carry out any work activities] : Status 2- Ambulatory and capable of all self care but unable to carry out any work activities. Up and about more than 50% of waking hours [Normal] : affect appropriate [de-identified] : Uses cane for ambulation. [de-identified] : Old and well healed right breast mastectomy scar. [de-identified] : Decreased range of motion in her right shoulder status post right-sided mastectomy. She is uses elbow supported high cane for ambulation.

## 2025-06-06 NOTE — PHYSICAL EXAM
[Ambulatory and capable of all self care but unable to carry out any work activities] : Status 2- Ambulatory and capable of all self care but unable to carry out any work activities. Up and about more than 50% of waking hours [Normal] : affect appropriate [de-identified] : Uses cane for ambulation. [de-identified] : Old and well healed right breast mastectomy scar. [de-identified] : Decreased range of motion in her right shoulder status post right-sided mastectomy. She is uses elbow supported high cane for ambulation.

## 2025-06-06 NOTE — PHYSICAL EXAM
[Ambulatory and capable of all self care but unable to carry out any work activities] : Status 2- Ambulatory and capable of all self care but unable to carry out any work activities. Up and about more than 50% of waking hours [Normal] : affect appropriate [de-identified] : Uses cane for ambulation. [de-identified] : Old and well healed right breast mastectomy scar. [de-identified] : Decreased range of motion in her right shoulder status post right-sided mastectomy. She is uses elbow supported high cane for ambulation.

## 2025-06-06 NOTE — ASSESSMENT
[Palliative] : Goals of care discussed with patient: Palliative [Patient/Caregiver not ready to engage] : Patient/Caregiver not ready to engage [FreeTextEntry1] : # Malignant neoplasm of right breast initially staged IIIB ([id number]) diagnosed in 2014. - Briefly she has been treated with Taxotere, Pertuzumab and Herceptin, Herceptin maintenance, trastuzumab, exemestane, Talbert, Anastrozole, multiple radiation to the supraclavicular and chest wall as well, and now on TDM 1 with demonstrated response unfortunately course has been complicated by thrombocytopenia. She was previously on full dose 3.6 mg /kg but this has been on hold due to thrombocytopenia. She got total of 4 cycles prior transferring her care to us. - Ideally translation of records into English will be helpful. - 08/11/2023 restarted TDM-1 with Ado-trastuzumab emtansine (Kadcyla) at a lower dose level 3 mg/kg given the thrombocytopenia (in past) is on his platelet count is greater than 75,000 - as Cycle 5. - 09/27/2023 Ado-trastuzumab emtansine (Kadcyla) dose decrease to 2.4 mg/kg due to thrombocytopenia. - 11/08/2023 the patient reports right supraclavicular discomfort (similar as to when she progressed the last time). - 11/22/2023 PET - No definite sites of pathologic FDG uptake. - 02/20/2024 PET/CT - [facility]. - 02/2024 ECHO EF 67%. - 06/20/2024 ECHO LV Ejection Fraction EF of 66 %. - 09/12/2024 ECHO LV Ejection Fraction EF of 66 %. - 09/23/2024 CT C/A/P - Since July 11, 2024. No current CT evidence of active intrathoracic disease. Post therapeutic changes from right mastectomy, involving right upper lung field. No evidence of metastatic disease within the abdomen and pelvis. - 12/26/2024 CT C/A/P - Since September 23, 2024 No current evidence of metastatic disease to the chest abdomen or pelvis. - 01/07/2025 ECHO LV Ejection Fraction EF of 67 %. - 04/18/2025 ECHO LV Ejection Fraction EF of 69 %. - 04/29/20025 CT C/A/P - Status post right mastectomy with post radiation changes within the mid and upper lung anterior portions of the right lung. No suspicious pulmonary nodules. Since CT abdomen pelvis performed on March 26, 2024; No CT evidence of recurrent/metastatic disease within the abdomen or pelvis.  # Moderate thrombocytopenia from treatment. - had dose reductions. - 12/20/2023 started NPlate 3 mg/kg with every treatment Q3W if PLT<50-60K. - 08/2024 INS denied continuation of Nplate and therefore we decided to switch to Promacta 25 mg PO QD. - 09/18/2024 unable to tolerate Promacta due to significant pruritus and therefore d/c and reinstitute Nplate at 1 mcg/kg.  # Mild LFTs likely form Kadcyla less likely from statin - stable - will monitor. - she has known gallstones as well with colic will see what CT shows.  06/04/2025 Labs reviewed and results discussed with the patient; no pain reported - remains clinically stable to continue current management. All questions were answered to satisfaction.  PLAN: - continue Ado-trastuzumab emtansine (Kadcyla) 2.4 mg/kg Q21D - C32 GIVE TODAY. - continue Nplate 1 mcg/kg Q3W - GIVE TODAY. - repeat ECHO cardio Q3M - 07/2025. - repeat CT C/A/P for monitoring Q4M - 08/2025. - Mastectomy bras #2 and R breast prostheses #1 - 04/2026. - Labs today: CBC CMP TSH Ca15-3 RTC in 3 weeks with CBC CMP TSH Ca15-3 and same day treatment. [AdvancecareDate] : 12/20/2023

## 2025-06-06 NOTE — HISTORY OF PRESENT ILLNESS
[Disease: _____________________] : Disease: [unfilled] [AJCC Stage: ____] : AJCC Stage: [unfilled] [de-identified] : CC: Here to continue her treatment for right-sided breast cancer.  This is a very pleasant 80-year-old female she has a history of recurrent ER positive, [place] negative, HER2 positive breast cancer.  She was treated as below.  Please note records were in [place] and I did my best with my nurse practitioner to interpret the results but they are aware that English translation as needed.  07/2023 She relocated from [place] to continue her treatment here in the [facility].  The following is a summary of past treatments that she did in [place], [place] and [place]: 2013 she noticed hardness in her right breast, but biopsy revealed only fatty tissue and her mammogram was negative. 10/10/2014 Initially diagnosed with right breast carcinoma stage IIIb ([id number]) with right supraclavicular lymph nodes involvement.  Biopsy revealed:  11/23/2014-04/25/2015 completed 6 courses of pertuzumab, trastuzumab and docetaxel. 04/25/2025-11/10/2015 completed 6 cycles of Herceptin IV. 04/2015 started tamoxifen 1 tab daily.  05/20/2015 underwent right mastectomy with lymph nodes excision. 08/26//2015 radiation treatment to right breast. 11/16/2016 PET - progression in right supraclavicular lymph node. 01/20/2017 PET - progression in right supraclavicular lymph node increased in size. 01/20/2017 completed a course of tamoxifen. 01/2017 noted with progression in right subclavicular lymph node. Her treatment was changed to trastuzumab and anastrozole and she got it from 09/20/2017 to 08/20/2017.  which later was changed to exemestane, and she continued on it till 2019 when she was noted with progression to lymph nodes.  Again, she had biopsy, radiation and changed to a different aromatase inhibitor.  08/14/2020 again progression in the lymph nodes of her right neck.  09/03/2020 lymph node biopsy revealed: poorly differentiated carcinoma. 09/2020 started on trastuzumab and exemestane. 05/2021 she had radiation treatment to right supraclavicular area. 06/2022 and 12/2022 PET revealed positive response to the treatment and decrease FGD uptake in lymph nodes on both sides of her neck, right supraclavicular and mediastinal.  and started Kadcyla 3.6 mg/kg.  She received 5 treatments and had PET scan which showed response to the treatment.  She had 2 additional treatments after that and tolerated it well. Her last treatment was on 06/08/2023 and then postponed due to thrombocytopenia. [de-identified] : ER+Her2+ [place] -0 [de-identified] : 09/01/2023 accompanied by her son; Reports feeling well, no changes in chronic conditions or new complaints since the last visit.  09/06/2023 accompanied by her son; Reports feeling well, but noticed with increasing bruising.  9/27/23 She is here for follow up. She is due for cycle 3 in [facility] but cycle 7 total of Kadcyla. PLT today are 64,000 She has some bruising but no major bleeding.  10/18/2023 accompanied by son; Reports feeling well, but with difficulties to control her BP as she is watching news; no other changes in chronic conditions or new complaints since the last visit.  11/08/2023 accompanied by her daughter-in-law; Reports feeling well, but has discomfort at right neck area "where were previous lymph nodes before the last progression".  11/29/2023 accompanied by her son; Reports feeling well, no changes in chronic conditions or new complaints since the last visit. She had PET/CT on 11/22/23 [facility]YNNE. CBC today with stable mild thrombocytopenia. Due for cycle 6 of Kadcyla today.  12/20/2023 accompanied by her son; Reports feeling well, no changes in chronic conditions or new complaints since the last visit.  01/10/2024 accompanied by her son; Reports feeling well now, but had RUQ discomfort for 1 week post last treatment - all resolved now.  01/31/2024 accompanied by her daughter-in-law; Reports feeling well, but with RUQ discomfort for a few days after the last treatment - self-resolved.  2/21/24 She is here for follow-up. She is due for cycle 10 of Kadcyla CBC is stable with . She had a PET.CT on 2/20/24: IMPRESSION: 1. Since November 21, 2023, no new site of pathologic FDG uptake to suggest biologic tumor activity.  4/3/2024 She is here for follow up. She is due for cycle 12 of Kadcyla today CBC is with PLT 74. She feels well. had normal dexa.  04/24/2024 accompanied by her son; Reports feeling well at the baseline of her health status, no changes in chronic conditions or new complaints since the last visit.  6/5/24 She is here for follow up. She has been having some RUQ pain. LFTs stable give or take maybe Gallstones. CBC today stable with PLT 89; No other issues.  06/26/2024 accompanied by her son; Reports feeling well at the baseline of her health status, no changes in chronic conditions or new complaints since the last visit. Recent echo with LVEF 66%.  07/17/2024 accompanied by her daughter-in-law; Reports feeling well at the baseline of her health status, no changes in chronic conditions or new complaints since the last visit. She has whitecoat syndrome and her blood pressure goes up.  08/07/2024 Reports feeling well at the baseline of her health status, no changes in chronic conditions or new complaints since the last visit.  08/28/2024 Reports feeling well at the baseline of her health status, no changes in chronic conditions or new complaints since the last visit. She did not start Promacta yet, but has it ready at home as we decided to see what her PLT level will be today.  09/18/2024 She is here for F/U and treatment. We tried to switch Nplate to Promacta, but she developed significant generalized pruritus, and we stopped it. She took Promacta for a few days only. She feels well now, and pruritus resolved.  10/08/2024 Reports feeling well at the baseline of her health status, no changes in chronic conditions or new complaints since the last visit.  10/30/2024 Reports feeling well at the baseline of her health status, no changes in chronic conditions or new complaints since the last visit.  11/20/2024 Reports recently fell in the park during her power walk. She has a lot of black and blues that are resolving now. At this time, today, she is back to baseline of her health status.  12/11/2024 Reports feeling well at the baseline of her health status, no changes in chronic conditions or new complaints since the last visit. She is doing well reports gum bleeding I recommended she see a dentist and also very mild epistaxis at times does have dry nose dry mouth has forced air likely from lack of humidity recommended bacitracin into nostrils.  CBC is otherwise stable  1/22/25 She is here for follow up.  CBC with stable moderate thrombocytopenia from chemo she feels well no issues  2/12/25 She is here for follow-up CBC today with platelet count of 90  neutrophils are normal CA 15 3 trending down now 33.3.  She feels well  3/5/2025 She is here for follow up. She feels well. She did have a recent cold.  CBC was done today it showed a platelet count of 93,000.  CMP was done by cardiology mild LFTs elevation which has been chronic this was on February 26.  03/26/2025 Reports feeling well at the baseline of her health status, no changes in chronic conditions or new complaints since the last visit.  04/16/2025 Reports feeling well at the baseline of her health status, no changes in chronic conditions or new complaints since the last visit.  05/07/2025 Reports feeling well at the baseline of her health status, no changes in chronic conditions or new complaints since the last visit.  06/04/2025 She is here for follow-up and reports that she is at the baseline of her health status.  She has great psychosocial support from her family.  No new complaints or changes reported.

## 2025-06-06 NOTE — END OF VISIT
[FreeTextEntry3] : I was physically present for the key portions of the evaluation and management service provided.  I agree with the history and physical, and plan which I have reviewed and edited where appropriate.  She is here for follow-up remains on Kadcyla as above with Nplate support doing great blood work was done today for monitoring alk phos next imaging around August she could see us back in 3 weeks

## 2025-06-06 NOTE — HISTORY OF PRESENT ILLNESS
[Disease: _____________________] : Disease: [unfilled] [AJCC Stage: ____] : AJCC Stage: [unfilled] [de-identified] : CC: Here to continue her treatment for right-sided breast cancer.  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  She was treated as below.  Please note records were in Bulgarian and I did my best with my nurse practitioner to interpret the results but they are aware that English translation as needed.  07/2023 She relocated from [place] to continue her treatment here in the [facility].  The following is a summary of past treatments that she did in [place], [place] and [place]: 2013 she noticed hardness in her right breast, but biopsy revealed only fatty tissue and her mammogram was negative. 10/10/2014 Initially diagnosed with right breast carcinoma stage IIIb ([id number]) with right supraclavicular lymph nodes involvement.  Biopsy revealed:  11/23/2014-04/25/2015 completed 6 courses of pertuzumab, trastuzumab and docetaxel. 04/25/2025-11/10/2015 completed 6 cycles of Herceptin IV. 04/2015 started tamoxifen 1 tab daily.  05/20/2015 underwent right mastectomy with lymph nodes excision. 08/26//2015 radiation treatment to right breast. 11/16/2016 PET - progression in right supraclavicular lymph node. 01/20/2017 PET - progression in right supraclavicular lymph node increased in size. 01/20/2017 completed a course of tamoxifen. 01/2017 noted with progression in right subclavicular lymph node. Her treatment was changed to trastuzumab and anastrozole and she got it from 09/20/2017 to 08/20/2017.  which later was changed to exemestane, and she continued on it till 2019 when she was noted with progression to lymph nodes.  Again, she had biopsy, radiation and changed to a different aromatase inhibitor.  08/14/2020 again progression in the lymph nodes of her right neck.  09/03/2020 lymph node biopsy revealed: poorly differentiated carcinoma. 09/2020 started on trastuzumab and exemestane. 05/2021 she had radiation treatment to right supraclavicular area. 06/2022 and 12/2022 PET revealed positive response to the treatment and decrease FGD uptake in lymph nodes on both sides of her neck, right supraclavicular and mediastinal.  and started Kadcyla 3.6 mg/kg.  She received 5 treatments and had PET scan which showed response to the treatment.  She had 2 additional treatments after that and tolerated it well. Her last treatment was on 06/08/2023 and then postponed due to thrombocytopenia. [de-identified] : ER+Her2+ [place] -0 [de-identified] : 09/01/2023 accompanied by her son; Reports feeling well, no changes in chronic conditions or new complaints since the last visit.  09/06/2023 accompanied by her son; Reports feeling well, but noticed with increasing bruising.  9/27/23 She is here for follow up. She is due for cycle 3 in [facility] but cycle 7 total of Kadcyla. PLT today are 64,000 She has some bruising but no major bleeding.  10/18/2023 accompanied by son; Reports feeling well, but with difficulties to control her BP as she is watching news; no other changes in chronic conditions or new complaints since the last visit.  11/08/2023 accompanied by her daughter-in-law; Reports feeling well, but has discomfort at right neck area "where were previous lymph nodes before the last progression".  11/29/2023 accompanied by her son; Reports feeling well, no changes in chronic conditions or new complaints since the last visit. She had PET/CT on 11/22/23 L[facility]NE. CBC today with stable mild thrombocytopenia. Due for cycle 6 of Kadcyla today.  12/20/2023 accompanied by her son; Reports feeling well, no changes in chronic conditions or new complaints since the last visit.  01/10/2024 accompanied by her son; Reports feeling well now, but had RUQ discomfort for 1 week post last treatment - all resolved now.  01/31/2024 accompanied by her daughter-in-law; Reports feeling well, but with RUQ discomfort for a few days after the last treatment - self-resolved.  2/21/24 She is here for follow-up. She is due for cycle 10 of Kadcyla CBC is stable with . She had a PET.CT on 2/20/24: IMPRESSION: 1. Since November 21, 2023, no new site of pathologic FDG uptake to suggest biologic tumor activity.  4/3/2024 She is here for follow up. She is due for cycle 12 of Kadcyla today CBC is with PLT 74. She feels well. had normal dexa.  04/24/2024 accompanied by her son; Reports feeling well at the baseline of her health status, no changes in chronic conditions or new complaints since the last visit.  6/5/24 She is here for follow up. She has been having some RUQ pain. LFTs stable give or take maybe Gallstones. CBC today stable with PLT 89; No other issues.  06/26/2024 accompanied by her son; Reports feeling well at the baseline of her health status, no changes in chronic conditions or new complaints since the last visit. Recent echo with LVEF 66%.  07/17/2024 accompanied by her daughter-in-law; Reports feeling well at the baseline of her health status, no changes in chronic conditions or new complaints since the last visit. She has whitecoat syndrome and her blood pressure goes up.  08/07/2024 Reports feeling well at the baseline of her health status, no changes in chronic conditions or new complaints since the last visit.  08/28/2024 Reports feeling well at the baseline of her health status, no changes in chronic conditions or new complaints since the last visit. She did not start Promacta yet, but has it ready at home as we decided to see what her PLT level will be today.  09/18/2024 She is here for F/U and treatment. We tried to switch Nplate to Promacta, but she developed significant generalized pruritus, and we stopped it. She took Promacta for a few days only. She feels well now, and pruritus resolved.  10/08/2024 Reports feeling well at the baseline of her health status, no changes in chronic conditions or new complaints since the last visit.  10/30/2024 Reports feeling well at the baseline of her health status, no changes in chronic conditions or new complaints since the last visit.  11/20/2024 Reports recently fell in the park during her power walk. She has a lot of black and blues that are resolving now. At this time, today, she is back to baseline of her health status.  12/11/2024 Reports feeling well at the baseline of her health status, no changes in chronic conditions or new complaints since the last visit. She is doing well reports gum bleeding I recommended she see a dentist and also very mild epistaxis at times does have dry nose dry mouth has forced air likely from lack of humidity recommended bacitracin into nostrils.  CBC is otherwise stable  1/22/25 She is here for follow up.  CBC with stable moderate thrombocytopenia from chemo she feels well no issues  2/12/25 She is here for follow-up CBC today with platelet count of 90  neutrophils are normal CA 15 3 trending down now 33.3.  She feels well  3/5/2025 She is here for follow up. She feels well. She did have a recent cold.  CBC was done today it showed a platelet count of 93,000.  CMP was done by cardiology mild LFTs elevation which has been chronic this was on February 26.  03/26/2025 Reports feeling well at the baseline of her health status, no changes in chronic conditions or new complaints since the last visit.  04/16/2025 Reports feeling well at the baseline of her health status, no changes in chronic conditions or new complaints since the last visit.  05/07/2025 Reports feeling well at the baseline of her health status, no changes in chronic conditions or new complaints since the last visit.  06/04/2025 She is here for follow-up and reports that she is at the baseline of her health status.  She has great psychosocial support from her family.  No new complaints or changes reported.

## 2025-06-06 NOTE — ASSESSMENT
[Palliative] : Goals of care discussed with patient: Palliative [Patient/Caregiver not ready to engage] : Patient/Caregiver not ready to engage [FreeTextEntry1] : # Malignant neoplasm of right breast initially staged IIIB ([id number]) diagnosed in 2014. - Briefly she has been treated with Taxotere, Pertuzumab and Herceptin, Herceptin maintenance, trastuzumab, exemestane, Talbert, Anastrozole, multiple radiation to the supraclavicular and chest wall as well, and now on TDM 1 with demonstrated response unfortunately course has been complicated by thrombocytopenia. She was previously on full dose 3.6 mg /kg but this has been on hold due to thrombocytopenia. She got total of 4 cycles prior transferring her care to us. - Ideally translation of records into English will be helpful. - 08/11/2023 restarted TDM-1 with Ado-trastuzumab emtansine (Kadcyla) at a lower dose level 3 mg/kg given the thrombocytopenia (in past) is on his platelet count is greater than 75,000 - as Cycle 5. - 09/27/2023 Ado-trastuzumab emtansine (Kadcyla) dose decrease to 2.4 mg/kg due to thrombocytopenia. - 11/08/2023 the patient reports right supraclavicular discomfort (similar as to when she progressed the last time). - 11/22/2023 PET - No definite sites of pathologic FDG uptake. - 02/20/2024 PET/CT - LYNNE. - 02/2024 ECHO EF 67%. - 06/20/2024 ECHO LV Ejection Fraction EF of 66 %. - 09/12/2024 ECHO LV Ejection Fraction EF of 66 %. - 09/23/2024 CT C/A/P - Since July 11, 2024. No current CT evidence of active intrathoracic disease. Post therapeutic changes from right mastectomy, involving right upper lung field. No evidence of metastatic disease within the abdomen and pelvis. - 12/26/2024 CT C/A/P - Since September 23, 2024 No current evidence of metastatic disease to the chest abdomen or pelvis. - 01/07/2025 ECHO LV Ejection Fraction EF of 67 %. - 04/18/2025 ECHO LV Ejection Fraction EF of 69 %. - 04/29/20025 CT C/A/P - Status post right mastectomy with post radiation changes within the mid and upper lung anterior portions of the right lung. No suspicious pulmonary nodules. Since CT abdomen pelvis performed on March 26, 2024; No CT evidence of recurrent/metastatic disease within the abdomen or pelvis.  # Moderate thrombocytopenia from treatment. - had dose reductions. - 12/20/2023 started NPlate 3 mg/kg with every treatment Q3W if PLT<50-60K. - 08/2024 INS denied continuation of Nplate and therefore we decided to switch to Promacta 25 mg PO QD. - 09/18/2024 unable to tolerate Promacta due to significant pruritus and therefore d/c and reinstitute Nplate at 1 mcg/kg.  # Mild LFTs likely form Kadcyla less likely from statin - stable - will monitor. - she has known gallstones as well with colic will see what CT shows.  06/04/2025 Labs reviewed and results discussed with the patient; no pain reported - remains clinically stable to continue current management. All questions were answered to satisfaction.  PLAN: - continue Ado-trastuzumab emtansine (Kadcyla) 2.4 mg/kg Q21D - C32 GIVE TODAY. - continue Nplate 1 mcg/kg Q3W - GIVE TODAY. - repeat ECHO cardio Q3M - 07/2025. - repeat CT C/A/P for monitoring Q4M - 08/2025. - Mastectomy bras #2 and R breast prostheses #1 - 04/2026. - Labs today: CBC CMP TSH Ca15-3 RTC in 3 weeks with CBC CMP TSH Ca15-3 and same day treatment. [AdvancecareDate] : 12/20/2023

## 2025-06-06 NOTE — HISTORY OF PRESENT ILLNESS
[Disease: _____________________] : Disease: [unfilled] [AJCC Stage: ____] : AJCC Stage: [unfilled] [de-identified] : CC: Here to continue her treatment for right-sided breast cancer.  This is a very pleasant 80-year-old female she has a history of recurrent ER positive, DE negative, HER2 positive breast cancer.  She was treated as below.  Please note records were in Thai and I did my best with my nurse practitioner to interpret the results but they are aware that English translation as needed.  07/2023 She relocated from [place] to continue her treatment here in the [facility].  The following is a summary of past treatments that she did in [place], [place] and [place]: 2013 she noticed hardness in her right breast, but biopsy revealed only fatty tissue and her mammogram was negative. 10/10/2014 Initially diagnosed with right breast carcinoma stage IIIb ([id number]) with right supraclavicular lymph nodes involvement.  Biopsy revealed:  11/23/2014-04/25/2015 completed 6 courses of pertuzumab, trastuzumab and docetaxel. 04/25/2025-11/10/2015 completed 6 cycles of Herceptin IV. 04/2015 started tamoxifen 1 tab daily.  05/20/2015 underwent right mastectomy with lymph nodes excision. 08/26//2015 radiation treatment to right breast. 11/16/2016 PET - progression in right supraclavicular lymph node. 01/20/2017 PET - progression in right supraclavicular lymph node increased in size. 01/20/2017 completed a course of tamoxifen. 01/2017 noted with progression in right subclavicular lymph node. Her treatment was changed to trastuzumab and anastrozole and she got it from 09/20/2017 to 08/20/2017.  which later was changed to exemestane, and she continued on it till 2019 when she was noted with progression to lymph nodes.  Again, she had biopsy, radiation and changed to a different aromatase inhibitor.  08/14/2020 again progression in the lymph nodes of her right neck.  09/03/2020 lymph node biopsy revealed: poorly differentiated carcinoma. 09/2020 started on trastuzumab and exemestane. 05/2021 she had radiation treatment to right supraclavicular area. 06/2022 and 12/2022 PET revealed positive response to the treatment and decrease FGD uptake in lymph nodes on both sides of her neck, right supraclavicular and mediastinal.  and started Kadcyla 3.6 mg/kg.  She received 5 treatments and had PET scan which showed response to the treatment.  She had 2 additional treatments after that and tolerated it well. Her last treatment was on 06/08/2023 and then postponed due to thrombocytopenia. [de-identified] : ER+Her2+ [place] -0 [de-identified] : 09/01/2023 accompanied by her son; Reports feeling well, no changes in chronic conditions or new complaints since the last visit.  09/06/2023 accompanied by her son; Reports feeling well, but noticed with increasing bruising.  9/27/23 She is here for follow up. She is due for cycle 3 in [facility] but cycle 7 total of Kadcyla. PLT today are 64,000 She has some bruising but no major bleeding.  10/18/2023 accompanied by son; Reports feeling well, but with difficulties to control her BP as she is watching news; no other changes in chronic conditions or new complaints since the last visit.  11/08/2023 accompanied by her daughter-in-law; Reports feeling well, but has discomfort at right neck area "where were previous lymph nodes before the last progression".  11/29/2023 accompanied by her son; Reports feeling well, no changes in chronic conditions or new complaints since the last visit. She had PET/CT on 11/22/23 LYNNE. CBC today with stable mild thrombocytopenia. Due for cycle 6 of Kadcyla today.  12/20/2023 accompanied by her son; Reports feeling well, no changes in chronic conditions or new complaints since the last visit.  01/10/2024 accompanied by her son; Reports feeling well now, but had RUQ discomfort for 1 week post last treatment - all resolved now.  01/31/2024 accompanied by her daughter-in-law; Reports feeling well, but with RUQ discomfort for a few days after the last treatment - self-resolved.  2/21/24 She is here for follow-up. She is due for cycle 10 of Kadcyla CBC is stable with . She had a PET.CT on 2/20/24: IMPRESSION: 1. Since November 21, 2023, no new site of pathologic FDG uptake to suggest biologic tumor activity.  4/3/2024 She is here for follow up. She is due for cycle 12 of Kadcyla today CBC is with PLT 74. She feels well. had normal dexa.  04/24/2024 accompanied by her son; Reports feeling well at the baseline of her health status, no changes in chronic conditions or new complaints since the last visit.  6/5/24 She is here for follow up. She has been having some RUQ pain. LFTs stable give or take maybe Gallstones. CBC today stable with PLT 89; No other issues.  06/26/2024 accompanied by her son; Reports feeling well at the baseline of her health status, no changes in chronic conditions or new complaints since the last visit. Recent echo with LVEF 66%.  07/17/2024 accompanied by her daughter-in-law; Reports feeling well at the baseline of her health status, no changes in chronic conditions or new complaints since the last visit. She has whitecoat syndrome and her blood pressure goes up.  08/07/2024 Reports feeling well at the baseline of her health status, no changes in chronic conditions or new complaints since the last visit.  08/28/2024 Reports feeling well at the baseline of her health status, no changes in chronic conditions or new complaints since the last visit. She did not start Promacta yet, but has it ready at home as we decided to see what her PLT level will be today.  09/18/2024 She is here for F/U and treatment. We tried to switch Nplate to Promacta, but she developed significant generalized pruritus, and we stopped it. She took Promacta for a few days only. She feels well now, and pruritus resolved.  10/08/2024 Reports feeling well at the baseline of her health status, no changes in chronic conditions or new complaints since the last visit.  10/30/2024 Reports feeling well at the baseline of her health status, no changes in chronic conditions or new complaints since the last visit.  11/20/2024 Reports recently fell in the park during her power walk. She has a lot of black and blues that are resolving now. At this time, today, she is back to baseline of her health status.  12/11/2024 Reports feeling well at the baseline of her health status, no changes in chronic conditions or new complaints since the last visit. She is doing well reports gum bleeding I recommended she see a dentist and also very mild epistaxis at times does have dry nose dry mouth has forced air likely from lack of humidity recommended bacitracin into nostrils.  CBC is otherwise stable  1/22/25 She is here for follow up.  CBC with stable moderate thrombocytopenia from chemo she feels well no issues  2/12/25 She is here for follow-up CBC today with platelet count of 90  neutrophils are normal CA 15 3 trending down now 33.3.  She feels well  3/5/2025 She is here for follow up. She feels well. She did have a recent cold.  CBC was done today it showed a platelet count of 93,000.  CMP was done by cardiology mild LFTs elevation which has been chronic this was on February 26.  03/26/2025 Reports feeling well at the baseline of her health status, no changes in chronic conditions or new complaints since the last visit.  04/16/2025 Reports feeling well at the baseline of her health status, no changes in chronic conditions or new complaints since the last visit.  05/07/2025 Reports feeling well at the baseline of her health status, no changes in chronic conditions or new complaints since the last visit.  06/04/2025 She is here for follow-up and reports that she is at the baseline of her health status.  She has great psychosocial support from her family.  No new complaints or changes reported.

## 2025-06-06 NOTE — REASON FOR VISIT
Death Discharge Summary            Patient ID:  Adan Bolden y.o.  1994  [id number]    Admit Date: 2023    Attending Physician:  No att. providers found    Chief Complaint:  No chief complaint on file. Problem List:    Patient Active Problem List    Diagnosis Date Noted    Organ donor 2023    Goals of care, counseling/discussion 2023    Cerebral edema ([facility] 2023    Palliative care by specialist 2023    Cardiogenic shock ([facility] 2023    Acute respiratory failure with hypoxia ([facility] [address] 2023    Lactic acidosis 2023    Type 2 diabetes mellitus with hyperglycemia, without long-term current use of insulin ([facility] [address] 2023    Myoclonus 2023    Cardiac arrest ([facility] [address] 2023    Heart disease 2023       Death Diagnosis:    Sudden cardiac death. Hospital Course:    Ms. Dacia Ramsay was admitted on  after she presented with cardiac arrest. She was given TPA empirically during the code for suspected PE. She did achieve ROSC eventually in ED. Supportive care was continued. Repeat CT head and EEG was consistent with anoxic brain injury. Patient then developed central DI with hypernatremia. She lost al brain stem reflexes. Cerebral perfusion scan was done which was consistent with brain death. EEG was also done which was consistent with severe anoxic brain injury. Based on cerebral perfusion scan consistent with brain death, patient was pronounced dead at 11:21 am        Condition at discharge:         ADD:  This patient was diagnosed with brain death  and was made an organ donor.  She proceeded to donation 23    Amanda Hernandez, [facility]  [phone]  2023 10:39 AM [Follow-Up Visit] : a follow-up [FreeTextEntry2] : Breast cancer of the right breast

## 2025-06-26 NOTE — PHYSICAL EXAM
[Ambulatory and capable of all self care but unable to carry out any work activities] : Status 2- Ambulatory and capable of all self care but unable to carry out any work activities. Up and about more than 50% of waking hours [Normal] : affect appropriate [de-identified] : Uses elbow-supported cane for ambulation. [de-identified] : Old and well healed right breast mastectomy scar. [de-identified] : Decreased range of motion in her right shoulder status post right-sided mastectomy. She is uses elbow supported high cane for ambulation.

## 2025-06-26 NOTE — ASSESSMENT
[Palliative] : Goals of care discussed with patient: Palliative [Patient/Caregiver not ready to engage] : Patient/Caregiver not ready to engage [FreeTextEntry1] : # Malignant neoplasm of right breast initially staged IIIB ([id number]) diagnosed in 2014. - Briefly she has been treated with Taxotere, Pertuzumab and Herceptin, Herceptin maintenance, trastuzumab, exemestane, Talbert, Anastrozole, multiple radiation to the supraclavicular and chest wall as well, and now on TDM 1 with demonstrated response unfortunately course has been complicated by thrombocytopenia. She was previously on full dose 3.6 mg /kg but this has been on hold due to thrombocytopenia. She got total of 4 cycles prior transferring her care to us. - Ideally translation of records into English will be helpful. - 08/11/2023 restarted TDM-1 with Ado-trastuzumab emtansine (Kadcyla) at a lower dose level 3 mg/kg given the thrombocytopenia (in past) is on his platelet count is greater than 75,000 - as Cycle 5. - 09/27/2023 Ado-trastuzumab emtansine (Kadcyla) dose decrease to 2.4 mg/kg due to thrombocytopenia. - 11/08/2023 the patient reports right supraclavicular discomfort (similar as to when she progressed the last time). - 11/22/2023 PET - No definite sites of pathologic FDG uptake. - 02/20/2024 PET/CT - LYNNE. - 02/2024 ECHO EF 67%. - 06/20/2024 ECHO LV Ejection Fraction EF of 66 %. - 09/12/2024 ECHO LV Ejection Fraction EF of 66 %. - 09/23/2024 CT C/A/P - Since July 11, 2024. No current CT evidence of active intrathoracic disease. Post therapeutic changes from right mastectomy, involving right upper lung field. No evidence of metastatic disease within the abdomen and pelvis. - 12/26/2024 CT C/A/P - Since September 23, 2024 No current evidence of metastatic disease to the chest abdomen or pelvis. - 01/07/2025 ECHO LV Ejection Fraction EF of 67 %. - 04/18/2025 ECHO LV Ejection Fraction EF of 69 %. - 04/29/20025 CT C/A/P - Status post right mastectomy with post radiation changes within the mid and upper lung anterior portions of the right lung. No suspicious pulmonary nodules. Since CT abdomen pelvis performed on March 26, 2024; No CT evidence of recurrent/metastatic disease within the abdomen or pelvis.  # Moderate thrombocytopenia from treatment. - had dose reductions. - 12/20/2023 started NPlate 3 mg/kg with every treatment Q3W if PLT<50-60K. - 08/2024 INS denied continuation of Nplate and therefore we decided to switch to Promacta 25 mg PO QD. - 09/18/2024 unable to tolerate Promacta due to significant pruritus and therefore d/c and reinstitute Nplate at 1 mcg/kg.  # Mild LFTs likely form Kadcyla less likely from statin - stable - will monitor. - she has known gallstones as well with colic will see what CT shows.  06/25/2025 Labs reviewed and results discussed with the patient; no pain reported - remains clinically stable to continue current management. All questions were answered to satisfaction.  PLAN: - continue Ado-trastuzumab emtansine (Kadcyla) 2.4 mg/kg Q21D - C33 GIVE TODAY. - continue Nplate 1 mcg/kg Q3W - GIVE TODAY. - repeat ECHO cardio Q3M - 07/2025 - ordered. - repeat CT C/A/P w/IVC for monitoring Q4M - 08/2025 - ordered. - Mastectomy bras #2 and R breast prostheses #1 - 04/2026. - Labs today: CBC CMP TSH Ca15-3 RTC in 3 weeks with CBC CMP TSH Ca15-3 and same day treatment. [AdvancecareDate] : 06/25/2025

## 2025-06-26 NOTE — PHYSICAL EXAM
[Ambulatory and capable of all self care but unable to carry out any work activities] : Status 2- Ambulatory and capable of all self care but unable to carry out any work activities. Up and about more than 50% of waking hours [Normal] : affect appropriate [de-identified] : Uses elbow-supported cane for ambulation. [de-identified] : Decreased range of motion in her right shoulder status post right-sided mastectomy. She is uses elbow supported high cane for ambulation. [de-identified] : Old and well healed right breast mastectomy scar.

## 2025-06-26 NOTE — END OF VISIT
[FreeTextEntry3] : I was physically present for the key portions of the evaluation and management service provided.  I agree with the history and physical, and plan which I have reviewed and edited where appropriate.  Jovani is here for follow-up she remains on Kadcyla due for cycle 33 also remains on Nplate for support for thrombocytopenia monitoring her with echo send imaging due for next scans around August will see us back in 3 weeks with next treatment

## 2025-06-26 NOTE — HISTORY OF PRESENT ILLNESS
[Disease: _____________________] : Disease: [unfilled] [AJCC Stage: ____] : AJCC Stage: [unfilled] [de-identified] : CC: Here to continue her treatment for right-sided breast cancer.  This is a very pleasant 80-year-old female she has a history of recurrent ER positive, [place] negative, HER2 positive breast cancer.  She was treated as below.  Please note records were in Rwandan and I did my best with my nurse practitioner to interpret the results but they are aware that English translation as needed.  07/2023 She relocated from [place] to continue her treatment here in the [facility].  The following is a summary of past treatments that she did in [place], [place] and [place]: 2013 she noticed hardness in her right breast, but biopsy revealed only fatty tissue and her mammogram was negative. 10/10/2014 Initially diagnosed with right breast carcinoma stage IIIb ([id number]) with right supraclavicular lymph nodes involvement.  Biopsy revealed:  11/23/2014-04/25/2015 completed 6 courses of pertuzumab, trastuzumab and docetaxel. 04/25/2025-11/10/2015 completed 6 cycles of Herceptin IV. 04/2015 started tamoxifen 1 tab daily.  05/20/2015 underwent right mastectomy with lymph nodes excision. 08/26//2015 radiation treatment to right breast. 11/16/2016 PET - progression in right supraclavicular lymph node. 01/20/2017 PET - progression in right supraclavicular lymph node increased in size. 01/20/2017 completed a course of tamoxifen. 01/2017 noted with progression in right subclavicular lymph node. Her treatment was changed to trastuzumab and anastrozole and she got it from 09/20/2017 to 08/20/2017.  which later was changed to exemestane, and she continued on it till 2019 when she was noted with progression to lymph nodes.  Again, she had biopsy, radiation and changed to a different aromatase inhibitor.  08/14/2020 again progression in the lymph nodes of her right neck.  09/03/2020 lymph node biopsy revealed: poorly differentiated carcinoma. 09/2020 started on trastuzumab and exemestane. 05/2021 she had radiation treatment to right supraclavicular area. 06/2022 and 12/2022 PET revealed positive response to the treatment and decrease FGD uptake in lymph nodes on both sides of her neck, right supraclavicular and mediastinal.  and started Kadcyla 3.6 mg/kg.  She received 5 treatments and had PET scan which showed response to the treatment.  She had 2 additional treatments after that and tolerated it well. Her last treatment was on 06/08/2023 and then postponed due to thrombocytopenia. [de-identified] : ER+Her2+ CA -0 [de-identified] : 09/01/2023 accompanied by her son; Reports feeling well, no changes in chronic conditions or new complaints since the last visit.  09/06/2023 accompanied by her son; Reports feeling well, but noticed with increasing bruising.  9/27/23 She is here for follow up. She is due for cycle 3 in [facility] but cycle 7 total of Kadcyla. PLT today are 64,000 She has some bruising but no major bleeding.  10/18/2023 accompanied by son; Reports feeling well, but with difficulties to control her BP as she is watching news; no other changes in chronic conditions or new complaints since the last visit.  11/08/2023 accompanied by her daughter-in-law; Reports feeling well, but has discomfort at right neck area "where were previous lymph nodes before the last progression".  11/29/2023 accompanied by her son; Reports feeling well, no changes in chronic conditions or new complaints since the last visit. She had PET/CT on 11/22/23 LYNNE. CBC today with stable mild thrombocytopenia. Due for cycle 6 of Kadcyla today.  12/20/2023 accompanied by her son; Reports feeling well, no changes in chronic conditions or new complaints since the last visit.  01/10/2024 accompanied by her son; Reports feeling well now, but had RUQ discomfort for 1 week post last treatment - all resolved now.  01/31/2024 accompanied by her daughter-in-law; Reports feeling well, but with RUQ discomfort for a few days after the last treatment - self-resolved.  2/21/24 She is here for follow-up. She is due for cycle 10 of Kadcyla CBC is stable with . She had a PET.CT on 2/20/24: IMPRESSION: 1. Since November 21, 2023, no new site of pathologic FDG uptake to suggest biologic tumor activity.  4/3/2024 She is here for follow up. She is due for cycle 12 of Kadcyla today CBC is with PLT 74. She feels well. had normal dexa.  04/24/2024 accompanied by her son; Reports feeling well at the baseline of her health status, no changes in chronic conditions or new complaints since the last visit.  6/5/24 She is here for follow up. She has been having some RUQ pain. LFTs stable give or take maybe Gallstones. CBC today stable with PLT 89; No other issues.  06/26/2024 accompanied by her son; Reports feeling well at the baseline of her health status, no changes in chronic conditions or new complaints since the last visit. Recent echo with LVEF 66%.  07/17/2024 accompanied by her daughter-in-law; Reports feeling well at the baseline of her health status, no changes in chronic conditions or new complaints since the last visit. She has whitecoat syndrome and her blood pressure goes up.  08/07/2024 Reports feeling well at the baseline of her health status, no changes in chronic conditions or new complaints since the last visit.  08/28/2024 Reports feeling well at the baseline of her health status, no changes in chronic conditions or new complaints since the last visit. She did not start Promacta yet, but has it ready at home as we decided to see what her PLT level will be today.  09/18/2024 She is here for F/U and treatment. We tried to switch Nplate to Promacta, but she developed significant generalized pruritus, and we stopped it. She took Promacta for a few days only. She feels well now, and pruritus resolved.  10/08/2024 Reports feeling well at the baseline of her health status, no changes in chronic conditions or new complaints since the last visit.  10/30/2024 Reports feeling well at the baseline of her health status, no changes in chronic conditions or new complaints since the last visit.  11/20/2024 Reports recently fell in the park during her power walk. She has a lot of black and blues that are resolving now. At this time, today, she is back to baseline of her health status.  12/11/2024 Reports feeling well at the baseline of her health status, no changes in chronic conditions or new complaints since the last visit. She is doing well reports gum bleeding I recommended she see a dentist and also very mild epistaxis at times does have dry nose dry mouth has forced air likely from lack of humidity recommended bacitracin into nostrils.  CBC is otherwise stable  1/22/25 She is here for follow up.  CBC with stable moderate thrombocytopenia from chemo she feels well no issues  2/12/25 She is here for follow-up CBC today with platelet count of 90  neutrophils are normal CA 15 3 trending down now 33.3.  She feels well  3/5/2025 She is here for follow up. She feels well. She did have a recent cold.  CBC was done today it showed a platelet count of 93,000.  CMP was done by cardiology mild LFTs elevation which has been chronic this was on February 26.  03/26/2025 Reports feeling well at the baseline of her health status, no changes in chronic conditions or new complaints since the last visit.  04/16/2025 Reports feeling well at the baseline of her health status, no changes in chronic conditions or new complaints since the last visit.  05/07/2025 Reports feeling well at the baseline of her health status, no changes in chronic conditions or new complaints since the last visit.  06/04/2025 She is here for follow-up and reports that she is at the baseline of her health status.  She has great psychosocial support from her family.  No new complaints or changes reported.  06/25/2025 Reports feeling well at the baseline of her health status, no changes in chronic conditions or new complaints since the last visit. Reports good psychosocial support at home.

## 2025-06-26 NOTE — HISTORY OF PRESENT ILLNESS
[Disease: _____________________] : Disease: [unfilled] [AJCC Stage: ____] : AJCC Stage: [unfilled] [de-identified] : CC: Here to continue her treatment for right-sided breast cancer.  This is a very pleasant 80-year-old female she has a history of recurrent ER positive, [place] negative, HER2 positive breast cancer.  She was treated as below.  Please note records were in Latvian and I did my best with my nurse practitioner to interpret the results but they are aware that English translation as needed.  07/2023 She relocated from [place] to continue her treatment here in the [facility].  The following is a summary of past treatments that she did in [place], [place] and [place]: 2013 she noticed hardness in her right breast, but biopsy revealed only fatty tissue and her mammogram was negative. 10/10/2014 Initially diagnosed with right breast carcinoma stage IIIb ([id number]) with right supraclavicular lymph nodes involvement.  Biopsy revealed:  11/23/2014-04/25/2015 completed 6 courses of pertuzumab, trastuzumab and docetaxel. 04/25/2025-11/10/2015 completed 6 cycles of Herceptin IV. 04/2015 started tamoxifen 1 tab daily.  05/20/2015 underwent right mastectomy with lymph nodes excision. 08/26//2015 radiation treatment to right breast. 11/16/2016 PET - progression in right supraclavicular lymph node. 01/20/2017 PET - progression in right supraclavicular lymph node increased in size. 01/20/2017 completed a course of tamoxifen. 01/2017 noted with progression in right subclavicular lymph node. Her treatment was changed to trastuzumab and anastrozole and she got it from 09/20/2017 to 08/20/2017.  which later was changed to exemestane, and she continued on it till 2019 when she was noted with progression to lymph nodes.  Again, she had biopsy, radiation and changed to a different aromatase inhibitor.  08/14/2020 again progression in the lymph nodes of her right neck.  09/03/2020 lymph node biopsy revealed: poorly differentiated carcinoma. 09/2020 started on trastuzumab and exemestane. 05/2021 she had radiation treatment to right supraclavicular area. 06/2022 and 12/2022 PET revealed positive response to the treatment and decrease FGD uptake in lymph nodes on both sides of her neck, right supraclavicular and mediastinal.  and started Kadcyla 3.6 mg/kg.  She received 5 treatments and had PET scan which showed response to the treatment.  She had 2 additional treatments after that and tolerated it well. Her last treatment was on 06/08/2023 and then postponed due to thrombocytopenia. [de-identified] : ER+Her2+ CO -0 [de-identified] : 09/01/2023 accompanied by her son; Reports feeling well, no changes in chronic conditions or new complaints since the last visit.  09/06/2023 accompanied by her son; Reports feeling well, but noticed with increasing bruising.  9/27/23 She is here for follow up. She is due for cycle 3 in [facility] but cycle 7 total of Kadcyla. PLT today are 64,000 She has some bruising but no major bleeding.  10/18/2023 accompanied by son; Reports feeling well, but with difficulties to control her BP as she is watching news; no other changes in chronic conditions or new complaints since the last visit.  11/08/2023 accompanied by her daughter-in-law; Reports feeling well, but has discomfort at right neck area "where were previous lymph nodes before the last progression".  11/29/2023 accompanied by her son; Reports feeling well, no changes in chronic conditions or new complaints since the last visit. She had PET/CT on 11/22/23 LYNNE. CBC today with stable mild thrombocytopenia. Due for cycle 6 of Kadcyla today.  12/20/2023 accompanied by her son; Reports feeling well, no changes in chronic conditions or new complaints since the last visit.  01/10/2024 accompanied by her son; Reports feeling well now, but had RUQ discomfort for 1 week post last treatment - all resolved now.  01/31/2024 accompanied by her daughter-in-law; Reports feeling well, but with RUQ discomfort for a few days after the last treatment - self-resolved.  2/21/24 She is here for follow-up. She is due for cycle 10 of Kadcyla CBC is stable with . She had a PET.CT on 2/20/24: IMPRESSION: 1. Since November 21, 2023, no new site of pathologic FDG uptake to suggest biologic tumor activity.  4/3/2024 She is here for follow up. She is due for cycle 12 of Kadcyla today CBC is with PLT 74. She feels well. had normal dexa.  04/24/2024 accompanied by her son; Reports feeling well at the baseline of her health status, no changes in chronic conditions or new complaints since the last visit.  6/5/24 She is here for follow up. She has been having some RUQ pain. LFTs stable give or take maybe Gallstones. CBC today stable with PLT 89; No other issues.  06/26/2024 accompanied by her son; Reports feeling well at the baseline of her health status, no changes in chronic conditions or new complaints since the last visit. Recent echo with LVEF 66%.  07/17/2024 accompanied by her daughter-in-law; Reports feeling well at the baseline of her health status, no changes in chronic conditions or new complaints since the last visit. She has whitecoat syndrome and her blood pressure goes up.  08/07/2024 Reports feeling well at the baseline of her health status, no changes in chronic conditions or new complaints since the last visit.  08/28/2024 Reports feeling well at the baseline of her health status, no changes in chronic conditions or new complaints since the last visit. She did not start Promacta yet, but has it ready at home as we decided to see what her PLT level will be today.  09/18/2024 She is here for F/U and treatment. We tried to switch Nplate to Promacta, but she developed significant generalized pruritus, and we stopped it. She took Promacta for a few days only. She feels well now, and pruritus resolved.  10/08/2024 Reports feeling well at the baseline of her health status, no changes in chronic conditions or new complaints since the last visit.  10/30/2024 Reports feeling well at the baseline of her health status, no changes in chronic conditions or new complaints since the last visit.  11/20/2024 Reports recently fell in the park during her power walk. She has a lot of black and blues that are resolving now. At this time, today, she is back to baseline of her health status.  12/11/2024 Reports feeling well at the baseline of her health status, no changes in chronic conditions or new complaints since the last visit. She is doing well reports gum bleeding I recommended she see a dentist and also very mild epistaxis at times does have dry nose dry mouth has forced air likely from lack of humidity recommended bacitracin into nostrils.  CBC is otherwise stable  1/22/25 She is here for follow up.  CBC with stable moderate thrombocytopenia from chemo she feels well no issues  2/12/25 She is here for follow-up CBC today with platelet count of 90  neutrophils are normal CA 15 3 trending down now 33.3.  She feels well  3/5/2025 She is here for follow up. She feels well. She did have a recent cold.  CBC was done today it showed a platelet count of 93,000.  CMP was done by cardiology mild LFTs elevation which has been chronic this was on February 26.  03/26/2025 Reports feeling well at the baseline of her health status, no changes in chronic conditions or new complaints since the last visit.  04/16/2025 Reports feeling well at the baseline of her health status, no changes in chronic conditions or new complaints since the last visit.  05/07/2025 Reports feeling well at the baseline of her health status, no changes in chronic conditions or new complaints since the last visit.  06/04/2025 She is here for follow-up and reports that she is at the baseline of her health status.  She has great psychosocial support from her family.  No new complaints or changes reported.  06/25/2025 Reports feeling well at the baseline of her health status, no changes in chronic conditions or new complaints since the last visit. Reports good psychosocial support at home.

## 2025-06-26 NOTE — ASSESSMENT
[Palliative] : Goals of care discussed with patient: Palliative [Patient/Caregiver not ready to engage] : Patient/Caregiver not ready to engage [FreeTextEntry1] : # Malignant neoplasm of right breast initially staged IIIB ([id number]) diagnosed in 2014. - Briefly she has been treated with Taxotere, Pertuzumab and Herceptin, Herceptin maintenance, trastuzumab, exemestane, Talbert, Anastrozole, multiple radiation to the supraclavicular and chest wall as well, and now on TDM 1 with demonstrated response unfortunately course has been complicated by thrombocytopenia. She was previously on full dose 3.6 mg /kg but this has been on hold due to thrombocytopenia. She got total of 4 cycles prior transferring her care to us. - Ideally translation of records into English will be helpful. - 08/11/2023 restarted TDM-1 with Ado-trastuzumab emtansine (Kadcyla) at a lower dose level 3 mg/kg given the thrombocytopenia (in past) is on his platelet count is greater than 75,000 - as Cycle 5. - 09/27/2023 Ado-trastuzumab emtansine (Kadcyla) dose decrease to 2.4 mg/kg due to thrombocytopenia. - 11/08/2023 the patient reports right supraclavicular discomfort (similar as to when she progressed the last time). - 11/22/2023 PET - No definite sites of pathologic FDG uptake. - 02/20/2024 PET/CT - LYNNE. - 02/2024 ECHO EF 67%. - 06/20/2024 ECHO LV Ejection Fraction EF of 66 %. - 09/12/2024 ECHO LV Ejection Fraction EF of 66 %. - 09/23/2024 CT C/A/P - Since July 11, 2024. No current CT evidence of active intrathoracic disease. Post therapeutic changes from right mastectomy, involving right upper lung field. No evidence of metastatic disease within the abdomen and pelvis. - 12/26/2024 CT C/A/P - Since September 23, 2024 No current evidence of metastatic disease to the chest abdomen or pelvis. - 01/07/2025 ECHO LV Ejection Fraction EF of 67 %. - 04/18/2025 ECHO LV Ejection Fraction EF of 69 %. - 04/29/20025 CT C/A/P - Status post right mastectomy with post radiation changes within the mid and upper lung anterior portions of the right lung. No suspicious pulmonary nodules. Since CT abdomen pelvis performed on March 26, 2024; No CT evidence of recurrent/metastatic disease within the abdomen or pelvis.  # Moderate thrombocytopenia from treatment. - had dose reductions. - 12/20/2023 started NPlate 3 mg/kg with every treatment Q3W if PLT<50-60K. - 08/2024 INS denied continuation of Nplate and therefore we decided to switch to Promacta 25 mg PO QD. - 09/18/2024 unable to tolerate Promacta due to significant pruritus and therefore d/c and reinstitute Nplate at 1 mcg/kg.  # Mild LFTs likely form Kadcyla less likely from statin - stable - will monitor. - she has known gallstones as well with colic will see what CT shows.  06/25/2025 Labs reviewed and results discussed with the patient; no pain reported - remains clinically stable to continue current management. All questions were answered to satisfaction.  PLAN: - continue Ado-trastuzumab emtansine (Kadcyla) 2.4 mg/kg Q21D - C33 GIVE TODAY. - continue Nplate 1 mcg/kg Q3W - GIVE TODAY. - repeat ECHO cardio Q3M - 07/2025 - ordered. - repeat CT C/A/P w/IVC for monitoring Q4M - 08/2025 - ordered. - Mastectomy bras #2 and R breast prostheses #1 - 04/2026. - Labs today: CBC CMP TSH Ca15-3 RTC in 3 weeks with CBC CMP TSH Ca15-3 and same day treatment. [AdvancecareDate] : 06/25/2025

## 2025-07-25 NOTE — BEGINNING OF VISIT
[0] : 2) Feeling down, depressed, or hopeless: Not at all (0) [PHQ-2 Negative] : PHQ-2 Negative [Never] : Never [Date Discussed (MM/DD/YY): ___] : Discussed: [unfilled] [Reviewed, no changes] : Reviewed, no changes [[id number]] : 0

## 2025-07-25 NOTE — ASSESSMENT
[Palliative] : Goals of care discussed with patient: Palliative [Patient/Caregiver not ready to engage] : Patient/Caregiver not ready to engage [FreeTextEntry1] : # Malignant neoplasm of right breast initially staged IIIB ([id number]) diagnosed in 2014. - Briefly she has been treated with Taxotere, Pertuzumab and Herceptin, Herceptin maintenance, trastuzumab, exemestane, Talbert, Anastrozole, multiple radiation to the supraclavicular and chest wall as well, and now on TDM 1 with demonstrated response unfortunately course has been complicated by thrombocytopenia. She was previously on full dose 3.6 mg /kg but this has been on hold due to thrombocytopenia. She got total of 4 cycles prior transferring her care to us. - Ideally translation of records into English will be helpful. - 08/11/2023 restarted TDM-1 with Ado-trastuzumab emtansine (Kadcyla) at a lower dose level 3 mg/kg given the thrombocytopenia (in past) is on his platelet count is greater than 75,000 - as Cycle 5. - 09/27/2023 Ado-trastuzumab emtansine (Kadcyla) dose decrease to 2.4 mg/kg due to thrombocytopenia. - 11/08/2023 the patient reports right supraclavicular discomfort (similar as to when she progressed the last time). - 11/22/2023 PET - No definite sites of pathologic FDG uptake. - 02/20/2024 PET/CT - [facility]. - 02/2024 ECHO EF 67%. - 06/20/2024 ECHO LV Ejection Fraction EF of 66 %. - 09/12/2024 ECHO LV Ejection Fraction EF of 66 %. - 09/23/2024 CT C/A/P - Since July 11, 2024. No current CT evidence of active intrathoracic disease. Post therapeutic changes from right mastectomy, involving right upper lung field. No evidence of metastatic disease within the abdomen and pelvis. - 12/26/2024 CT C/A/P - Since September 23, 2024 No current evidence of metastatic disease to the chest abdomen or pelvis. - 01/07/2025 ECHO LV Ejection Fraction EF of 67 %. - 04/18/2025 ECHO LV Ejection Fraction EF of 69 %. - 04/29/20025 CT C/A/P - Status post right mastectomy with post radiation changes within the mid and upper lung anterior portions of the right lung. No suspicious pulmonary nodules. Since CT abdomen pelvis performed on March 26, 2024; No CT evidence of recurrent/metastatic disease within the abdomen or pelvis.  # Moderate thrombocytopenia from treatment. - had dose reductions. - 12/20/2023 started NPlate 3 mg/kg with every treatment Q3W if PLT<50-60K. - 08/2024 INS denied continuation of Nplate and therefore we decided to switch to Promacta 25 mg PO QD. - 09/18/2024 unable to tolerate Promacta due to significant pruritus and therefore d/c and reinstitute Nplate at 1 mcg/kg.  # Mild LFTs likely form Kadcyla less likely from statin - stable - will monitor. - she has known gallstones as well with colic will see what CT shows.  07/16/2025 Labs reviewed and results discussed with the patient; no pain reported - remains clinically stable to continue current management. All questions were answered to satisfaction.  PLAN: - continue Ado-trastuzumab emtansine (Kadcyla) 2.4 mg/kg Q21D - C34 GIVE TODAY. - continue Nplate 1 mcg/kg Q3W - GIVE TODAY. - repeat ECHO cardio Q3M - 07/2025 - ordered. - repeat CT C/A/P w/IVC for monitoring Q4M - 08/2025 - ordered. - Mastectomy bras #2 and R breast prostheses #1 - 04/2026. - Labs today: CBC CMP H Ca15-3 RTC in 3 weeks with CBC CMP  Ca15-3 and same day treatment.  [AdvancecareDate] : 06/25/2025

## 2025-07-25 NOTE — BEGINNING OF VISIT
[0] : 2) Feeling down, depressed, or hopeless: Not at all (0) [PHQ-2 Negative] : PHQ-2 Negative [Never] : Never [Date Discussed (MM/DD/YY): ___] : Discussed: [unfilled] [Reviewed, no changes] : Reviewed, no changes [[id number]] : 0

## 2025-07-25 NOTE — ASSESSMENT
[Palliative] : Goals of care discussed with patient: Palliative [Patient/Caregiver not ready to engage] : Patient/Caregiver not ready to engage [FreeTextEntry1] : # Malignant neoplasm of right breast initially staged IIIB ([id number]) diagnosed in 2014. - Briefly she has been treated with Taxotere, Pertuzumab and Herceptin, Herceptin maintenance, trastuzumab, exemestane, Talbert, Anastrozole, multiple radiation to the supraclavicular and chest wall as well, and now on TDM 1 with demonstrated response unfortunately course has been complicated by thrombocytopenia. She was previously on full dose 3.6 mg /kg but this has been on hold due to thrombocytopenia. She got total of 4 cycles prior transferring her care to us. - Ideally translation of records into English will be helpful. - 08/11/2023 restarted TDM-1 with Ado-trastuzumab emtansine (Kadcyla) at a lower dose level 3 mg/kg given the thrombocytopenia (in past) is on his platelet count is greater than 75,000 - as Cycle 5. - 09/27/2023 Ado-trastuzumab emtansine (Kadcyla) dose decrease to 2.4 mg/kg due to thrombocytopenia. - 11/08/2023 the patient reports right supraclavicular discomfort (similar as to when she progressed the last time). - 11/22/2023 PET - No definite sites of pathologic FDG uptake. - 02/20/2024 PET/CT - [facility]. - 02/2024 ECHO EF 67%. - 06/20/2024 ECHO LV Ejection Fraction EF of 66 %. - 09/12/2024 ECHO LV Ejection Fraction EF of 66 %. - 09/23/2024 CT C/A/P - Since July 11, 2024. No current CT evidence of active intrathoracic disease. Post therapeutic changes from right mastectomy, involving right upper lung field. No evidence of metastatic disease within the abdomen and pelvis. - 12/26/2024 CT C/A/P - Since September 23, 2024 No current evidence of metastatic disease to the chest abdomen or pelvis. - 01/07/2025 ECHO LV Ejection Fraction EF of 67 %. - 04/18/2025 ECHO LV Ejection Fraction EF of 69 %. - 04/29/20025 CT C/A/P - Status post right mastectomy with post radiation changes within the mid and upper lung anterior portions of the right lung. No suspicious pulmonary nodules. Since CT abdomen pelvis performed on March 26, 2024; No CT evidence of recurrent/metastatic disease within the abdomen or pelvis.  # Moderate thrombocytopenia from treatment. - had dose reductions. - 12/20/2023 started NPlate 3 mg/kg with every treatment Q3W if PLT<50-60K. - 08/2024 INS denied continuation of Nplate and therefore we decided to switch to Promacta 25 mg PO QD. - 09/18/2024 unable to tolerate Promacta due to significant pruritus and therefore d/c and reinstitute Nplate at 1 mcg/kg.  # Mild LFTs likely form Kadcyla less likely from statin - stable - will monitor. - she has known gallstones as well with colic will see what CT shows.  07/16/2025 Labs reviewed and results discussed with the patient; no pain reported - remains clinically stable to continue current management. All questions were answered to satisfaction.  PLAN: - continue Ado-trastuzumab emtansine (Kadcyla) 2.4 mg/kg Q21D - C34 GIVE TODAY. - continue Nplate 1 mcg/kg Q3W - GIVE TODAY. - repeat ECHO cardio Q3M - 07/2025 - ordered. - repeat CT C/A/P w/IVC for monitoring Q4M - 08/2025 - ordered. - Mastectomy bras #2 and R breast prostheses #1 - 04/2026. - Labs today: CBC CMP H Ca15-3 RTC in 3 weeks with CBC CMP  Ca15-3 and same day treatment.  [AdvancecareDate] : 06/25/2025

## 2025-07-25 NOTE — ASSESSMENT
[Palliative] : Goals of care discussed with patient: Palliative [Patient/Caregiver not ready to engage] : Patient/Caregiver not ready to engage [FreeTextEntry1] : # Malignant neoplasm of right breast initially staged IIIB ([id number]) diagnosed in 2014. - Briefly she has been treated with Taxotere, Pertuzumab and Herceptin, Herceptin maintenance, trastuzumab, exemestane, Talbert, Anastrozole, multiple radiation to the supraclavicular and chest wall as well, and now on TDM 1 with demonstrated response unfortunately course has been complicated by thrombocytopenia. She was previously on full dose 3.6 mg /kg but this has been on hold due to thrombocytopenia. She got total of 4 cycles prior transferring her care to us. - Ideally translation of records into English will be helpful. - 08/11/2023 restarted TDM-1 with Ado-trastuzumab emtansine (Kadcyla) at a lower dose level 3 mg/kg given the thrombocytopenia (in past) is on his platelet count is greater than 75,000 - as Cycle 5. - 09/27/2023 Ado-trastuzumab emtansine (Kadcyla) dose decrease to 2.4 mg/kg due to thrombocytopenia. - 11/08/2023 the patient reports right supraclavicular discomfort (similar as to when she progressed the last time). - 11/22/2023 PET - No definite sites of pathologic FDG uptake. - 02/20/2024 PET/CT - [facility]. - 02/2024 ECHO EF 67%. - 06/20/2024 ECHO LV Ejection Fraction EF of 66 %. - 09/12/2024 ECHO LV Ejection Fraction EF of 66 %. - 09/23/2024 CT C/A/P - Since July 11, 2024. No current CT evidence of active intrathoracic disease. Post therapeutic changes from right mastectomy, involving right upper lung field. No evidence of metastatic disease within the abdomen and pelvis. - 12/26/2024 CT C/A/P - Since September 23, 2024 No current evidence of metastatic disease to the chest abdomen or pelvis. - 01/07/2025 ECHO LV Ejection Fraction EF of 67 %. - 04/18/2025 ECHO LV Ejection Fraction EF of 69 %. - 04/29/20025 CT C/A/P - Status post right mastectomy with post radiation changes within the mid and upper lung anterior portions of the right lung. No suspicious pulmonary nodules. Since CT abdomen pelvis performed on March 26, 2024; No CT evidence of recurrent/metastatic disease within the abdomen or pelvis.  # Moderate thrombocytopenia from treatment. - had dose reductions. - 12/20/2023 started NPlate 3 mg/kg with every treatment Q3W if PLT<50-60K. - 08/2024 INS denied continuation of Nplate and therefore we decided to switch to Promacta 25 mg PO QD. - 09/18/2024 unable to tolerate Promacta due to significant pruritus and therefore d/c and reinstitute Nplate at 1 mcg/kg.  # Mild LFTs likely form Kadcyla less likely from statin - stable - will monitor. - she has known gallstones as well with colic will see what CT shows.  07/16/2025 Labs reviewed and results discussed with the patient; no pain reported - remains clinically stable to continue current management. All questions were answered to satisfaction.  PLAN: - continue Ado-trastuzumab emtansine (Kadcyla) 2.4 mg/kg Q21D - C34 GIVE TODAY. - continue Nplate 1 mcg/kg Q3W - GIVE TODAY. - repeat ECHO cardio Q3M - 07/2025 - ordered. - repeat CT C/A/P w/IVC for monitoring Q4M - 08/2025 - ordered. - Mastectomy bras #2 and R breast prostheses #1 - 04/2026. - Labs today: CBC CMP H Ca15-3 RTC in 3 weeks with CBC CMP  Ca15-3 and same day treatment.  [AdvancecareDate] : 06/25/2025

## 2025-07-25 NOTE — REASON FOR VISIT
Pt is returning a call [phone]   [Follow-Up Visit] : a follow-up [FreeTextEntry2] : Breast cancer of the right breast

## 2025-07-25 NOTE — BEGINNING OF VISIT
[0] : 2) Feeling down, depressed, or hopeless: Not at all (0) [PHQ-2 Negative] : PHQ-2 Negative [Never] : Never [Date Discussed (MM/DD/YY): ___] : Discussed: [unfilled] [Reviewed, no changes] : Reviewed, no changes [[id number]] : 0

## 2025-07-25 NOTE — PHYSICAL EXAM
[Ambulatory and capable of all self care but unable to carry out any work activities] : Status 2- Ambulatory and capable of all self care but unable to carry out any work activities. Up and about more than 50% of waking hours [Normal] : affect appropriate [de-identified] : Uses elbow-supported cane for ambulation. [de-identified] : Old and well healed right breast mastectomy scar. [de-identified] : Decreased range of motion in her right shoulder status post right-sided mastectomy. She is uses elbow supported high cane for ambulation.

## 2025-07-25 NOTE — PHYSICAL EXAM
[Ambulatory and capable of all self care but unable to carry out any work activities] : Status 2- Ambulatory and capable of all self care but unable to carry out any work activities. Up and about more than 50% of waking hours [Normal] : affect appropriate [de-identified] : Uses elbow-supported cane for ambulation. [de-identified] : Old and well healed right breast mastectomy scar. [de-identified] : Decreased range of motion in her right shoulder status post right-sided mastectomy. She is uses elbow supported high cane for ambulation.

## 2025-07-25 NOTE — BEGINNING OF VISIT
[0] : 2) Feeling down, depressed, or hopeless: Not at all (0) [PHQ-2 Negative] : PHQ-2 Negative [Never] : Never [Date Discussed (MM/DD/YY): ___] : Discussed: [unfilled] [Reviewed, no changes] : Reviewed, no changes [[id number]] : 0

## 2025-07-25 NOTE — HISTORY OF PRESENT ILLNESS
[Disease: _____________________] : Disease: [unfilled] [AJCC Stage: ____] : AJCC Stage: [unfilled] [de-identified] : CC: Here to continue her treatment for right-sided breast cancer.  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  She was treated as below.  Please note records were in [place] and I did my best with my nurse practitioner to interpret the results but they are aware that English translation as needed.  07/2023 She relocated from [place] to continue her treatment here in the [facility].  The following is a summary of past treatments that she did in [place], [place] and [place]: 2013 she noticed hardness in her right breast, but biopsy revealed only fatty tissue and her mammogram was negative. 10/10/2014 Initially diagnosed with right breast carcinoma stage IIIb ([id number]) with right supraclavicular lymph nodes involvement.  Biopsy revealed:  11/23/2014-04/25/2015 completed 6 courses of pertuzumab, trastuzumab and docetaxel. 04/25/2025-11/10/2015 completed 6 cycles of Herceptin IV. 04/2015 started tamoxifen 1 tab daily.  05/20/2015 underwent right mastectomy with lymph nodes excision. 08/26//2015 radiation treatment to right breast. 11/16/2016 PET - progression in right supraclavicular lymph node. 01/20/2017 PET - progression in right supraclavicular lymph node increased in size. 01/20/2017 completed a course of tamoxifen. 01/2017 noted with progression in right subclavicular lymph node. Her treatment was changed to trastuzumab and anastrozole and she got it from 09/20/2017 to 08/20/2017.  which later was changed to exemestane, and she continued on it till 2019 when she was noted with progression to lymph nodes.  Again, she had biopsy, radiation and changed to a different aromatase inhibitor.  08/14/2020 again progression in the lymph nodes of her right neck.  09/03/2020 lymph node biopsy revealed: poorly differentiated carcinoma. 09/2020 started on trastuzumab and exemestane. 05/2021 she had radiation treatment to right supraclavicular area. 06/2022 and 12/2022 PET revealed positive response to the treatment and decrease FGD uptake in lymph nodes on both sides of her neck, right supraclavicular and mediastinal.  and started Kadcyla 3.6 mg/kg.  She received 5 treatments and had PET scan which showed response to the treatment.  She had 2 additional treatments after that and tolerated it well. Her last treatment was on 06/08/2023 and then postponed due to thrombocytopenia. [de-identified] : ER+Her2+ NC -0 [de-identified] : 09/01/2023 accompanied by her son; Reports feeling well, no changes in chronic conditions or new complaints since the last visit.  09/06/2023 accompanied by her son; Reports feeling well, but noticed with increasing bruising.  9/27/23 She is here for follow up. She is due for cycle 3 in [facility] but cycle 7 total of Kadcyla. PLT today are 64,000 She has some bruising but no major bleeding.  10/18/2023 accompanied by son; Reports feeling well, but with difficulties to control her BP as she is watching news; no other changes in chronic conditions or new complaints since the last visit.  11/08/2023 accompanied by her daughter-in-law; Reports feeling well, but has discomfort at right neck area "where were previous lymph nodes before the last progression".  11/29/2023 accompanied by her son; Reports feeling well, no changes in chronic conditions or new complaints since the last visit. She had PET/CT on 11/22/23 LYNNE. CBC today with stable mild thrombocytopenia. Due for cycle 6 of Kadcyla today.  12/20/2023 accompanied by her son; Reports feeling well, no changes in chronic conditions or new complaints since the last visit.  01/10/2024 accompanied by her son; Reports feeling well now, but had RUQ discomfort for 1 week post last treatment - all resolved now.  01/31/2024 accompanied by her daughter-in-law; Reports feeling well, but with RUQ discomfort for a few days after the last treatment - self-resolved.  2/21/24 She is here for follow-up. She is due for cycle 10 of Kadcyla CBC is stable with . She had a PET.CT on 2/20/24: IMPRESSION: 1. Since November 21, 2023, no new site of pathologic FDG uptake to suggest biologic tumor activity.  4/3/2024 She is here for follow up. She is due for cycle 12 of Kadcyla today CBC is with PLT 74. She feels well. had normal dexa.  04/24/2024 accompanied by her son; Reports feeling well at the baseline of her health status, no changes in chronic conditions or new complaints since the last visit.  6/5/24 She is here for follow up. She has been having some RUQ pain. LFTs stable give or take maybe Gallstones. CBC today stable with PLT 89; No other issues.  06/26/2024 accompanied by her son; Reports feeling well at the baseline of her health status, no changes in chronic conditions or new complaints since the last visit. Recent echo with LVEF 66%.  07/17/2024 accompanied by her daughter-in-law; Reports feeling well at the baseline of her health status, no changes in chronic conditions or new complaints since the last visit. She has whitecoat syndrome and her blood pressure goes up.  08/07/2024 Reports feeling well at the baseline of her health status, no changes in chronic conditions or new complaints since the last visit.  08/28/2024 Reports feeling well at the baseline of her health status, no changes in chronic conditions or new complaints since the last visit. She did not start Promacta yet, but has it ready at home as we decided to see what her PLT level will be today.  09/18/2024 She is here for F/U and treatment. We tried to switch Nplate to Promacta, but she developed significant generalized pruritus, and we stopped it. She took Promacta for a few days only. She feels well now, and pruritus resolved.  10/08/2024 Reports feeling well at the baseline of her health status, no changes in chronic conditions or new complaints since the last visit.  10/30/2024 Reports feeling well at the baseline of her health status, no changes in chronic conditions or new complaints since the last visit.  11/20/2024 Reports recently fell in the park during her power walk. She has a lot of black and blues that are resolving now. At this time, today, she is back to baseline of her health status.  12/11/2024 Reports feeling well at the baseline of her health status, no changes in chronic conditions or new complaints since the last visit. She is doing well reports gum bleeding I recommended she see a dentist and also very mild epistaxis at times does have dry nose dry mouth has forced air likely from lack of humidity recommended bacitracin into nostrils.  CBC is otherwise stable  1/22/25 She is here for follow up.  CBC with stable moderate thrombocytopenia from chemo she feels well no issues  2/12/25 She is here for follow-up CBC today with platelet count of 90  neutrophils are normal CA 15 3 trending down now 33.3.  She feels well  3/5/2025 She is here for follow up. She feels well. She did have a recent cold.  CBC was done today it showed a platelet count of 93,000.  CMP was done by cardiology mild LFTs elevation which has been chronic this was on February 26.  03/26/2025 Reports feeling well at the baseline of her health status, no changes in chronic conditions or new complaints since the last visit.  04/16/2025 Reports feeling well at the baseline of her health status, no changes in chronic conditions or new complaints since the last visit.  05/07/2025 Reports feeling well at the baseline of her health status, no changes in chronic conditions or new complaints since the last visit.  06/04/2025 She is here for follow-up and reports that she is at the baseline of her health status.  She has great psychosocial support from her family.  No new complaints or changes reported.  06/25/2025 Reports feeling well at the baseline of her health status, no changes in chronic conditions or new complaints since the last visit. Reports good psychosocial support at home.

## 2025-07-25 NOTE — HISTORY OF PRESENT ILLNESS
[Disease: _____________________] : Disease: [unfilled] [AJCC Stage: ____] : AJCC Stage: [unfilled] [de-identified] : CC: Here to continue her treatment for right-sided breast cancer.  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  She was treated as below.  Please note records were in [place] and I did my best with my nurse practitioner to interpret the results but they are aware that English translation as needed.  07/2023 She relocated from [place] to continue her treatment here in the [facility].  The following is a summary of past treatments that she did in [place], [place] and [place]: 2013 she noticed hardness in her right breast, but biopsy revealed only fatty tissue and her mammogram was negative. 10/10/2014 Initially diagnosed with right breast carcinoma stage IIIb ([id number]) with right supraclavicular lymph nodes involvement.  Biopsy revealed:  11/23/2014-04/25/2015 completed 6 courses of pertuzumab, trastuzumab and docetaxel. 04/25/2025-11/10/2015 completed 6 cycles of Herceptin IV. 04/2015 started tamoxifen 1 tab daily.  05/20/2015 underwent right mastectomy with lymph nodes excision. 08/26//2015 radiation treatment to right breast. 11/16/2016 PET - progression in right supraclavicular lymph node. 01/20/2017 PET - progression in right supraclavicular lymph node increased in size. 01/20/2017 completed a course of tamoxifen. 01/2017 noted with progression in right subclavicular lymph node. Her treatment was changed to trastuzumab and anastrozole and she got it from 09/20/2017 to 08/20/2017.  which later was changed to exemestane, and she continued on it till 2019 when she was noted with progression to lymph nodes.  Again, she had biopsy, radiation and changed to a different aromatase inhibitor.  08/14/2020 again progression in the lymph nodes of her right neck.  09/03/2020 lymph node biopsy revealed: poorly differentiated carcinoma. 09/2020 started on trastuzumab and exemestane. 05/2021 she had radiation treatment to right supraclavicular area. 06/2022 and 12/2022 PET revealed positive response to the treatment and decrease FGD uptake in lymph nodes on both sides of her neck, right supraclavicular and mediastinal.  and started Kadcyla 3.6 mg/kg.  She received 5 treatments and had PET scan which showed response to the treatment.  She had 2 additional treatments after that and tolerated it well. Her last treatment was on 06/08/2023 and then postponed due to thrombocytopenia. [de-identified] : ER+Her2+ CT -0 [de-identified] : 09/01/2023 accompanied by her son; Reports feeling well, no changes in chronic conditions or new complaints since the last visit.  09/06/2023 accompanied by her son; Reports feeling well, but noticed with increasing bruising.  9/27/23 She is here for follow up. She is due for cycle 3 in [facility] but cycle 7 total of Kadcyla. PLT today are 64,000 She has some bruising but no major bleeding.  10/18/2023 accompanied by son; Reports feeling well, but with difficulties to control her BP as she is watching news; no other changes in chronic conditions or new complaints since the last visit.  11/08/2023 accompanied by her daughter-in-law; Reports feeling well, but has discomfort at right neck area "where were previous lymph nodes before the last progression".  11/29/2023 accompanied by her son; Reports feeling well, no changes in chronic conditions or new complaints since the last visit. She had PET/CT on 11/22/23 [facility]. CBC today with stable mild thrombocytopenia. Due for cycle 6 of Kadcyla today.  12/20/2023 accompanied by her son; Reports feeling well, no changes in chronic conditions or new complaints since the last visit.  01/10/2024 accompanied by her son; Reports feeling well now, but had RUQ discomfort for 1 week post last treatment - all resolved now.  01/31/2024 accompanied by her daughter-in-law; Reports feeling well, but with RUQ discomfort for a few days after the last treatment - self-resolved.  2/21/24 She is here for follow-up. She is due for cycle 10 of Kadcyla CBC is stable with . She had a PET.CT on 2/20/24: IMPRESSION: 1. Since November 21, 2023, no new site of pathologic FDG uptake to suggest biologic tumor activity.  4/3/2024 She is here for follow up. She is due for cycle 12 of Kadcyla today CBC is with PLT 74. She feels well. had normal dexa.  04/24/2024 accompanied by her son; Reports feeling well at the baseline of her health status, no changes in chronic conditions or new complaints since the last visit.  6/5/24 She is here for follow up. She has been having some RUQ pain. LFTs stable give or take maybe Gallstones. CBC today stable with PLT 89; No other issues.  06/26/2024 accompanied by her son; Reports feeling well at the baseline of her health status, no changes in chronic conditions or new complaints since the last visit. Recent echo with LVEF 66%.  07/17/2024 accompanied by her daughter-in-law; Reports feeling well at the baseline of her health status, no changes in chronic conditions or new complaints since the last visit. She has whitecoat syndrome and her blood pressure goes up.  08/07/2024 Reports feeling well at the baseline of her health status, no changes in chronic conditions or new complaints since the last visit.  08/28/2024 Reports feeling well at the baseline of her health status, no changes in chronic conditions or new complaints since the last visit. She did not start Promacta yet, but has it ready at home as we decided to see what her PLT level will be today.  09/18/2024 She is here for F/U and treatment. We tried to switch Nplate to Promacta, but she developed significant generalized pruritus, and we stopped it. She took Promacta for a few days only. She feels well now, and pruritus resolved.  10/08/2024 Reports feeling well at the baseline of her health status, no changes in chronic conditions or new complaints since the last visit.  10/30/2024 Reports feeling well at the baseline of her health status, no changes in chronic conditions or new complaints since the last visit.  11/20/2024 Reports recently fell in the park during her power walk. She has a lot of black and blues that are resolving now. At this time, today, she is back to baseline of her health status.  12/11/2024 Reports feeling well at the baseline of her health status, no changes in chronic conditions or new complaints since the last visit. She is doing well reports gum bleeding I recommended she see a dentist and also very mild epistaxis at times does have dry nose dry mouth has forced air likely from lack of humidity recommended bacitracin into nostrils.  CBC is otherwise stable  1/22/25 She is here for follow up.  CBC with stable moderate thrombocytopenia from chemo she feels well no issues  2/12/25 She is here for follow-up CBC today with platelet count of 90  neutrophils are normal CA 15 3 trending down now 33.3.  She feels well  3/5/2025 She is here for follow up. She feels well. She did have a recent cold.  CBC was done today it showed a platelet count of 93,000.  CMP was done by cardiology mild LFTs elevation which has been chronic this was on February 26.  03/26/2025 Reports feeling well at the baseline of her health status, no changes in chronic conditions or new complaints since the last visit.  04/16/2025 Reports feeling well at the baseline of her health status, no changes in chronic conditions or new complaints since the last visit.  05/07/2025 Reports feeling well at the baseline of her health status, no changes in chronic conditions or new complaints since the last visit.  06/04/2025 She is here for follow-up and reports that she is at the baseline of her health status.  She has great psychosocial support from her family.  No new complaints or changes reported.  06/25/2025 Reports feeling well at the baseline of her health status, no changes in chronic conditions or new complaints since the last visit. Reports good psychosocial support at home.

## 2025-07-25 NOTE — ASSESSMENT
[Palliative] : Goals of care discussed with patient: Palliative [Patient/Caregiver not ready to engage] : Patient/Caregiver not ready to engage [FreeTextEntry1] : # Malignant neoplasm of right breast initially staged IIIB ([id number]) diagnosed in 2014. - Briefly she has been treated with Taxotere, Pertuzumab and Herceptin, Herceptin maintenance, trastuzumab, exemestane, Talbert, Anastrozole, multiple radiation to the supraclavicular and chest wall as well, and now on TDM 1 with demonstrated response unfortunately course has been complicated by thrombocytopenia. She was previously on full dose 3.6 mg /kg but this has been on hold due to thrombocytopenia. She got total of 4 cycles prior transferring her care to us. - Ideally translation of records into English will be helpful. - 08/11/2023 restarted TDM-1 with Ado-trastuzumab emtansine (Kadcyla) at a lower dose level 3 mg/kg given the thrombocytopenia (in past) is on his platelet count is greater than 75,000 - as Cycle 5. - 09/27/2023 Ado-trastuzumab emtansine (Kadcyla) dose decrease to 2.4 mg/kg due to thrombocytopenia. - 11/08/2023 the patient reports right supraclavicular discomfort (similar as to when she progressed the last time). - 11/22/2023 PET - No definite sites of pathologic FDG uptake. - 02/20/2024 PET/CT - [facility]. - 02/2024 ECHO EF 67%. - 06/20/2024 ECHO LV Ejection Fraction EF of 66 %. - 09/12/2024 ECHO LV Ejection Fraction EF of 66 %. - 09/23/2024 CT C/A/P - Since July 11, 2024. No current CT evidence of active intrathoracic disease. Post therapeutic changes from right mastectomy, involving right upper lung field. No evidence of metastatic disease within the abdomen and pelvis. - 12/26/2024 CT C/A/P - Since September 23, 2024 No current evidence of metastatic disease to the chest abdomen or pelvis. - 01/07/2025 ECHO LV Ejection Fraction EF of 67 %. - 04/18/2025 ECHO LV Ejection Fraction EF of 69 %. - 04/29/20025 CT C/A/P - Status post right mastectomy with post radiation changes within the mid and upper lung anterior portions of the right lung. No suspicious pulmonary nodules. Since CT abdomen pelvis performed on March 26, 2024; No CT evidence of recurrent/metastatic disease within the abdomen or pelvis.  # Moderate thrombocytopenia from treatment. - had dose reductions. - 12/20/2023 started NPlate 3 mg/kg with every treatment Q3W if PLT<50-60K. - 08/2024 INS denied continuation of Nplate and therefore we decided to switch to Promacta 25 mg PO QD. - 09/18/2024 unable to tolerate Promacta due to significant pruritus and therefore d/c and reinstitute Nplate at 1 mcg/kg.  # Mild LFTs likely form Kadcyla less likely from statin - stable - will monitor. - she has known gallstones as well with colic will see what CT shows.  07/16/2025 Labs reviewed and results discussed with the patient; no pain reported - remains clinically stable to continue current management. All questions were answered to satisfaction.  PLAN: - continue Ado-trastuzumab emtansine (Kadcyla) 2.4 mg/kg Q21D - C34 GIVE TODAY. - continue Nplate 1 mcg/kg Q3W - GIVE TODAY. - repeat ECHO cardio Q3M - 07/2025 - ordered. - repeat CT C/A/P w/IVC for monitoring Q4M - 08/2025 - ordered. - Mastectomy bras #2 and R breast prostheses #1 - 04/2026. - Labs today: CBC CMP H Ca15-3 RTC in 3 weeks with CBC CMP  Ca15-3 and same day treatment.  [AdvancecareDate] : 06/25/2025

## 2025-07-25 NOTE — PHYSICAL EXAM
[Ambulatory and capable of all self care but unable to carry out any work activities] : Status 2- Ambulatory and capable of all self care but unable to carry out any work activities. Up and about more than 50% of waking hours [Normal] : affect appropriate [de-identified] : Uses elbow-supported cane for ambulation. [de-identified] : Old and well healed right breast mastectomy scar. [de-identified] : Decreased range of motion in her right shoulder status post right-sided mastectomy. She is uses elbow supported high cane for ambulation.

## 2025-07-25 NOTE — END OF VISIT
[FreeTextEntry3] : I was physically present for the key portions of the evaluation and management service provided.  I agree with the history and physical, and plan which I have reviewed and edited where appropriate.  She is here for follow-up remains on Kadcyla and Nplate as above monitoring echo repeat CAT scans to monitor her stage IV breast cancer ordered for August blood work was done today she will see us back in 3 weeks NO NEED TO MONITOR TSH

## 2025-07-25 NOTE — HISTORY OF PRESENT ILLNESS
[Disease: _____________________] : Disease: [unfilled] [AJCC Stage: ____] : AJCC Stage: [unfilled] [de-identified] : CC: Here to continue her treatment for right-sided breast cancer.  This is a very pleasant 80-year-old female she has a history of recurrent ER positive, CO negative, HER2 positive breast cancer.  She was treated as below.  Please note records were in [place] and I did my best with my nurse practitioner to interpret the results but they are aware that English translation as needed.  07/2023 She relocated from [place] to continue her treatment here in the [facility].  The following is a summary of past treatments that she did in [place], [place] and [place]: 2013 she noticed hardness in her right breast, but biopsy revealed only fatty tissue and her mammogram was negative. 10/10/2014 Initially diagnosed with right breast carcinoma stage IIIb ([id number]) with right supraclavicular lymph nodes involvement.  Biopsy revealed:  11/23/2014-04/25/2015 completed 6 courses of pertuzumab, trastuzumab and docetaxel. 04/25/2025-11/10/2015 completed 6 cycles of Herceptin IV. 04/2015 started tamoxifen 1 tab daily.  05/20/2015 underwent right mastectomy with lymph nodes excision. 08/26//2015 radiation treatment to right breast. 11/16/2016 PET - progression in right supraclavicular lymph node. 01/20/2017 PET - progression in right supraclavicular lymph node increased in size. 01/20/2017 completed a course of tamoxifen. 01/2017 noted with progression in right subclavicular lymph node. Her treatment was changed to trastuzumab and anastrozole and she got it from 09/20/2017 to 08/20/2017.  which later was changed to exemestane, and she continued on it till 2019 when she was noted with progression to lymph nodes.  Again, she had biopsy, radiation and changed to a different aromatase inhibitor.  08/14/2020 again progression in the lymph nodes of her right neck.  09/03/2020 lymph node biopsy revealed: poorly differentiated carcinoma. 09/2020 started on trastuzumab and exemestane. 05/2021 she had radiation treatment to right supraclavicular area. 06/2022 and 12/2022 PET revealed positive response to the treatment and decrease FGD uptake in lymph nodes on both sides of her neck, right supraclavicular and mediastinal.  and started Kadcyla 3.6 mg/kg.  She received 5 treatments and had PET scan which showed response to the treatment.  She had 2 additional treatments after that and tolerated it well. Her last treatment was on 06/08/2023 and then postponed due to thrombocytopenia. [de-identified] : ER+Her2+ CT -0 [de-identified] : 09/01/2023 accompanied by her son; Reports feeling well, no changes in chronic conditions or new complaints since the last visit.  09/06/2023 accompanied by her son; Reports feeling well, but noticed with increasing bruising.  9/27/23 She is here for follow up. She is due for cycle 3 in [facility] but cycle 7 total of Kadcyla. PLT today are 64,000 She has some bruising but no major bleeding.  10/18/2023 accompanied by son; Reports feeling well, but with difficulties to control her BP as she is watching news; no other changes in chronic conditions or new complaints since the last visit.  11/08/2023 accompanied by her daughter-in-law; Reports feeling well, but has discomfort at right neck area "where were previous lymph nodes before the last progression".  11/29/2023 accompanied by her son; Reports feeling well, no changes in chronic conditions or new complaints since the last visit. She had PET/CT on 11/22/23 [facility]. CBC today with stable mild thrombocytopenia. Due for cycle 6 of Kadcyla today.  12/20/2023 accompanied by her son; Reports feeling well, no changes in chronic conditions or new complaints since the last visit.  01/10/2024 accompanied by her son; Reports feeling well now, but had RUQ discomfort for 1 week post last treatment - all resolved now.  01/31/2024 accompanied by her daughter-in-law; Reports feeling well, but with RUQ discomfort for a few days after the last treatment - self-resolved.  2/21/24 She is here for follow-up. She is due for cycle 10 of Kadcyla CBC is stable with . She had a PET.CT on 2/20/24: IMPRESSION: 1. Since November 21, 2023, no new site of pathologic FDG uptake to suggest biologic tumor activity.  4/3/2024 She is here for follow up. She is due for cycle 12 of Kadcyla today CBC is with PLT 74. She feels well. had normal dexa.  04/24/2024 accompanied by her son; Reports feeling well at the baseline of her health status, no changes in chronic conditions or new complaints since the last visit.  6/5/24 She is here for follow up. She has been having some RUQ pain. LFTs stable give or take maybe Gallstones. CBC today stable with PLT 89; No other issues.  06/26/2024 accompanied by her son; Reports feeling well at the baseline of her health status, no changes in chronic conditions or new complaints since the last visit. Recent echo with LVEF 66%.  07/17/2024 accompanied by her daughter-in-law; Reports feeling well at the baseline of her health status, no changes in chronic conditions or new complaints since the last visit. She has whitecoat syndrome and her blood pressure goes up.  08/07/2024 Reports feeling well at the baseline of her health status, no changes in chronic conditions or new complaints since the last visit.  08/28/2024 Reports feeling well at the baseline of her health status, no changes in chronic conditions or new complaints since the last visit. She did not start Promacta yet, but has it ready at home as we decided to see what her PLT level will be today.  09/18/2024 She is here for F/U and treatment. We tried to switch Nplate to Promacta, but she developed significant generalized pruritus, and we stopped it. She took Promacta for a few days only. She feels well now, and pruritus resolved.  10/08/2024 Reports feeling well at the baseline of her health status, no changes in chronic conditions or new complaints since the last visit.  10/30/2024 Reports feeling well at the baseline of her health status, no changes in chronic conditions or new complaints since the last visit.  11/20/2024 Reports recently fell in the park during her power walk. She has a lot of black and blues that are resolving now. At this time, today, she is back to baseline of her health status.  12/11/2024 Reports feeling well at the baseline of her health status, no changes in chronic conditions or new complaints since the last visit. She is doing well reports gum bleeding I recommended she see a dentist and also very mild epistaxis at times does have dry nose dry mouth has forced air likely from lack of humidity recommended bacitracin into nostrils.  CBC is otherwise stable  1/22/25 She is here for follow up.  CBC with stable moderate thrombocytopenia from chemo she feels well no issues  2/12/25 She is here for follow-up CBC today with platelet count of 90  neutrophils are normal CA 15 3 trending down now 33.3.  She feels well  3/5/2025 She is here for follow up. She feels well. She did have a recent cold.  CBC was done today it showed a platelet count of 93,000.  CMP was done by cardiology mild LFTs elevation which has been chronic this was on February 26.  03/26/2025 Reports feeling well at the baseline of her health status, no changes in chronic conditions or new complaints since the last visit.  04/16/2025 Reports feeling well at the baseline of her health status, no changes in chronic conditions or new complaints since the last visit.  05/07/2025 Reports feeling well at the baseline of her health status, no changes in chronic conditions or new complaints since the last visit.  06/04/2025 She is here for follow-up and reports that she is at the baseline of her health status.  She has great psychosocial support from her family.  No new complaints or changes reported.  06/25/2025 Reports feeling well at the baseline of her health status, no changes in chronic conditions or new complaints since the last visit. Reports good psychosocial support at home.

## 2025-07-25 NOTE — PHYSICAL EXAM
[Ambulatory and capable of all self care but unable to carry out any work activities] : Status 2- Ambulatory and capable of all self care but unable to carry out any work activities. Up and about more than 50% of waking hours [Normal] : affect appropriate [de-identified] : Uses elbow-supported cane for ambulation. [de-identified] : Old and well healed right breast mastectomy scar. [de-identified] : Decreased range of motion in her right shoulder status post right-sided mastectomy. She is uses elbow supported high cane for ambulation.

## 2025-07-25 NOTE — HISTORY OF PRESENT ILLNESS
[Disease: _____________________] : Disease: [unfilled] [AJCC Stage: ____] : AJCC Stage: [unfilled] [de-identified] : CC: Here to continue her treatment for right-sided breast cancer.  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  She was treated as below.  Please note records were in [place]ian and I did my best with my nurse practitioner to interpret the results but they are aware that English translation as needed.  07/2023 She relocated from [place] to continue her treatment here in the [facility].  The following is a summary of past treatments that she did in [place], [place] and [place]: 2013 she noticed hardness in her right breast, but biopsy revealed only fatty tissue and her mammogram was negative. 10/10/2014 Initially diagnosed with right breast carcinoma stage IIIb ([id number]) with right supraclavicular lymph nodes involvement.  Biopsy revealed:  11/23/2014-04/25/2015 completed 6 courses of pertuzumab, trastuzumab and docetaxel. 04/25/2025-11/10/2015 completed 6 cycles of Herceptin IV. 04/2015 started tamoxifen 1 tab daily.  05/20/2015 underwent right mastectomy with lymph nodes excision. 08/26//2015 radiation treatment to right breast. 11/16/2016 PET - progression in right supraclavicular lymph node. 01/20/2017 PET - progression in right supraclavicular lymph node increased in size. 01/20/2017 completed a course of tamoxifen. 01/2017 noted with progression in right subclavicular lymph node. Her treatment was changed to trastuzumab and anastrozole and she got it from 09/20/2017 to 08/20/2017.  which later was changed to exemestane, and she continued on it till 2019 when she was noted with progression to lymph nodes.  Again, she had biopsy, radiation and changed to a different aromatase inhibitor.  08/14/2020 again progression in the lymph nodes of her right neck.  09/03/2020 lymph node biopsy revealed: poorly differentiated carcinoma. 09/2020 started on trastuzumab and exemestane. 05/2021 she had radiation treatment to right supraclavicular area. 06/2022 and 12/2022 PET revealed positive response to the treatment and decrease FGD uptake in lymph nodes on both sides of her neck, right supraclavicular and mediastinal.  and started Kadcyla 3.6 mg/kg.  She received 5 treatments and had PET scan which showed response to the treatment.  She had 2 additional treatments after that and tolerated it well. Her last treatment was on 06/08/2023 and then postponed due to thrombocytopenia. [de-identified] : ER+Her2+ OH -0 [de-identified] : 09/01/2023 accompanied by her son; Reports feeling well, no changes in chronic conditions or new complaints since the last visit.  09/06/2023 accompanied by her son; Reports feeling well, but noticed with increasing bruising.  9/27/23 She is here for follow up. She is due for cycle 3 in [facility] but cycle 7 total of Kadcyla. PLT today are 64,000 She has some bruising but no major bleeding.  10/18/2023 accompanied by son; Reports feeling well, but with difficulties to control her BP as she is watching news; no other changes in chronic conditions or new complaints since the last visit.  11/08/2023 accompanied by her daughter-in-law; Reports feeling well, but has discomfort at right neck area "where were previous lymph nodes before the last progression".  11/29/2023 accompanied by her son; Reports feeling well, no changes in chronic conditions or new complaints since the last visit. She had PET/CT on 11/22/23 [facility]. CBC today with stable mild thrombocytopenia. Due for cycle 6 of Kadcyla today.  12/20/2023 accompanied by her son; Reports feeling well, no changes in chronic conditions or new complaints since the last visit.  01/10/2024 accompanied by her son; Reports feeling well now, but had RUQ discomfort for 1 week post last treatment - all resolved now.  01/31/2024 accompanied by her daughter-in-law; Reports feeling well, but with RUQ discomfort for a few days after the last treatment - self-resolved.  2/21/24 She is here for follow-up. She is due for cycle 10 of Kadcyla CBC is stable with . She had a PET.CT on 2/20/24: IMPRESSION: 1. Since November 21, 2023, no new site of pathologic FDG uptake to suggest biologic tumor activity.  4/3/2024 She is here for follow up. She is due for cycle 12 of Kadcyla today CBC is with PLT 74. She feels well. had normal dexa.  04/24/2024 accompanied by her son; Reports feeling well at the baseline of her health status, no changes in chronic conditions or new complaints since the last visit.  6/5/24 She is here for follow up. She has been having some RUQ pain. LFTs stable give or take maybe Gallstones. CBC today stable with PLT 89; No other issues.  06/26/2024 accompanied by her son; Reports feeling well at the baseline of her health status, no changes in chronic conditions or new complaints since the last visit. Recent echo with LVEF 66%.  07/17/2024 accompanied by her daughter-in-law; Reports feeling well at the baseline of her health status, no changes in chronic conditions or new complaints since the last visit. She has whitecoat syndrome and her blood pressure goes up.  08/07/2024 Reports feeling well at the baseline of her health status, no changes in chronic conditions or new complaints since the last visit.  08/28/2024 Reports feeling well at the baseline of her health status, no changes in chronic conditions or new complaints since the last visit. She did not start Promacta yet, but has it ready at home as we decided to see what her PLT level will be today.  09/18/2024 She is here for F/U and treatment. We tried to switch Nplate to Promacta, but she developed significant generalized pruritus, and we stopped it. She took Promacta for a few days only. She feels well now, and pruritus resolved.  10/08/2024 Reports feeling well at the baseline of her health status, no changes in chronic conditions or new complaints since the last visit.  10/30/2024 Reports feeling well at the baseline of her health status, no changes in chronic conditions or new complaints since the last visit.  11/20/2024 Reports recently fell in the park during her power walk. She has a lot of black and blues that are resolving now. At this time, today, she is back to baseline of her health status.  12/11/2024 Reports feeling well at the baseline of her health status, no changes in chronic conditions or new complaints since the last visit. She is doing well reports gum bleeding I recommended she see a dentist and also very mild epistaxis at times does have dry nose dry mouth has forced air likely from lack of humidity recommended bacitracin into nostrils.  CBC is otherwise stable  1/22/25 She is here for follow up.  CBC with stable moderate thrombocytopenia from chemo she feels well no issues  2/12/25 She is here for follow-up CBC today with platelet count of 90  neutrophils are normal CA 15 3 trending down now 33.3.  She feels well  3/5/2025 She is here for follow up. She feels well. She did have a recent cold.  CBC was done today it showed a platelet count of 93,000.  CMP was done by cardiology mild LFTs elevation which has been chronic this was on February 26.  03/26/2025 Reports feeling well at the baseline of her health status, no changes in chronic conditions or new complaints since the last visit.  04/16/2025 Reports feeling well at the baseline of her health status, no changes in chronic conditions or new complaints since the last visit.  05/07/2025 Reports feeling well at the baseline of her health status, no changes in chronic conditions or new complaints since the last visit.  06/04/2025 She is here for follow-up and reports that she is at the baseline of her health status.  She has great psychosocial support from her family.  No new complaints or changes reported.  06/25/2025 Reports feeling well at the baseline of her health status, no changes in chronic conditions or new complaints since the last visit. Reports good psychosocial support at home.